# Patient Record
Sex: FEMALE | Race: ASIAN | NOT HISPANIC OR LATINO | Employment: UNEMPLOYED | URBAN - METROPOLITAN AREA
[De-identification: names, ages, dates, MRNs, and addresses within clinical notes are randomized per-mention and may not be internally consistent; named-entity substitution may affect disease eponyms.]

---

## 2018-06-28 ENCOUNTER — OFFICE VISIT (OUTPATIENT)
Dept: INTERNAL MEDICINE CLINIC | Facility: CLINIC | Age: 58
End: 2018-06-28
Payer: COMMERCIAL

## 2018-06-28 VITALS
HEIGHT: 59 IN | DIASTOLIC BLOOD PRESSURE: 70 MMHG | RESPIRATION RATE: 14 BRPM | SYSTOLIC BLOOD PRESSURE: 110 MMHG | WEIGHT: 153 LBS | BODY MASS INDEX: 30.84 KG/M2 | HEART RATE: 60 BPM | TEMPERATURE: 98.3 F

## 2018-06-28 DIAGNOSIS — Z12.39 SCREENING FOR MALIGNANT NEOPLASM OF BREAST: ICD-10-CM
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
## 2018-06-28 DIAGNOSIS — M25.561 ACUTE PAIN OF RIGHT KNEE: ICD-10-CM

## 2018-06-28 DIAGNOSIS — Z00.00 PHYSICAL EXAM, ANNUAL: Primary | ICD-10-CM

## 2018-06-28 DIAGNOSIS — E78.5 HYPERLIPIDEMIA, UNSPECIFIED HYPERLIPIDEMIA TYPE: ICD-10-CM

## 2018-06-28 PROCEDURE — 99386 PREV VISIT NEW AGE 40-64: CPT | Performed by: INTERNAL MEDICINE

## 2018-06-29 NOTE — PROGRESS NOTES
Assessment/Plan     Healthy female exam      1  Keep the area of blister clean, non adherant bandage  Ice application for the knee with ibuprofen as needed  2  Patient Counseling:  --Nutrition: Stressed importance of moderation in sodium/caffeine intake, saturated fat and cholesterol, caloric balance, sufficient intake of fresh fruits, vegetables, fiber, calcium, iron, and 1 mg of folate supplement per day (for females capable of pregnancy)  --Exercise: Stressed the importance of regular exercise  Slowly increase weight bearing as tolerated  --Substance Abuse: Discussed cessation/primary prevention of tobacco, alcohol, or other drug use; driving or other dangerous activities under the influence; availability of treatment for abuse  --Immunizations reviewed and updated  --Metabolic screening was reviewed and updated  High triglycerides, discussed about carb reduction, weight loss  --Cancer screening was reviewed and updated  Screening pap in 3/18, referred for mammo    3  Discussed the patient's BMI with her  The BMI is above average; BMI management plan is completed  4  Follow up 6 mo  Subjective     Osvaldo Doran is a 65 y o  female and is here for a comprehensive physical exam  The patient reports had a bug bite 10 days back, developed 2 blisters  1 popped in a day and healed, the other one popped 2 days back with minimal discharge now   Had acute pain in right knee 2 mo back, now slowly improving  No more swelling  Had bloodwork done in Dec  Family h/o positive for CAD  Had a stress test in March which was normal        The following portions of the patient's history were reviewed and updated as appropriate: current medications, past medical history, past social history and past surgical history  Review of Systems  Review of Systems   Constitutional: Negative for fatigue, fever and unexpected weight change  HENT: Negative for ear pain, hearing loss and sore throat      Eyes: Negative for pain and discharge  Respiratory: Negative for cough, chest tightness and shortness of breath  Cardiovascular: Negative for chest pain and palpitations  Gastrointestinal: Negative for abdominal pain, blood in stool, constipation, diarrhea and nausea  Genitourinary: Negative for dysuria, frequency and hematuria  Musculoskeletal: Positive for arthralgias  Negative for joint swelling  Skin: Positive for wound  Negative for rash  Allergic/Immunologic: Negative for immunocompromised state  Neurological: Negative for dizziness and headaches  Hematological: Negative for adenopathy  Psychiatric/Behavioral: Negative for confusion and sleep disturbance  /70 (BP Location: Left arm, Patient Position: Sitting, Cuff Size: Standard)   Pulse 60   Temp 98 3 °F (36 8 °C)   Resp 14   Ht 4' 11" (1 499 m)   Wt 69 4 kg (153 lb)   BMI 30 90 kg/m²      Physical Exam   Constitutional: She appears well-developed and well-nourished  HENT:   Head: Normocephalic and atraumatic  Right Ear: Tympanic membrane normal    Left Ear: Tympanic membrane normal    Nose: Nose normal    Mouth/Throat: Oropharynx is clear and moist  No posterior oropharyngeal edema or posterior oropharyngeal erythema  Eyes: Conjunctivae are normal  Pupils are equal, round, and reactive to light  Right eye exhibits no discharge  Neck: Normal range of motion  Neck supple  No thyromegaly present  Cardiovascular: Normal rate, regular rhythm, S1 normal, S2 normal and normal heart sounds  PMI is not displaced  No murmur heard  Pulmonary/Chest: Effort normal and breath sounds normal  No accessory muscle usage  No apnea  No respiratory distress  She has no rhonchi  She has no rales  Right breast exhibits no mass and no nipple discharge  Left breast exhibits no mass and no nipple discharge  Abdominal: Soft  Normal appearance and bowel sounds are normal  She exhibits no shifting dullness  There is no hepatosplenomegaly   There is no tenderness  There is no rebound and no CVA tenderness  Musculoskeletal: Normal range of motion  She exhibits no edema or tenderness  Right knee: She exhibits effusion  No tenderness found  Legs:  Lymphadenopathy:     She has no cervical adenopathy  She has no axillary adenopathy  Neurological: She is alert  Skin: Skin is warm and intact  No rash noted  Psychiatric: She has a normal mood and affect  Her speech is normal    Nursing note and vitals reviewed

## 2018-07-02 ENCOUNTER — HOSPITAL ENCOUNTER (OUTPATIENT)
Dept: RADIOLOGY | Facility: HOSPITAL | Age: 58
Discharge: HOME | End: 2018-07-02
Payer: COMMERCIAL

## 2018-07-02 ENCOUNTER — HOSPITAL ENCOUNTER (OUTPATIENT)
Dept: MAMMOGRAPHY | Facility: HOSPITAL | Age: 58
Discharge: HOME | End: 2018-07-02
Payer: COMMERCIAL

## 2018-07-02 DIAGNOSIS — M25.561 ACUTE PAIN OF RIGHT KNEE: ICD-10-CM

## 2018-07-02 DIAGNOSIS — Z12.39 SCREENING FOR MALIGNANT NEOPLASM OF BREAST: ICD-10-CM

## 2018-07-02 PROCEDURE — 77067 SCR MAMMO BI INCL CAD: CPT

## 2018-07-02 PROCEDURE — 73562 X-RAY EXAM OF KNEE 3: CPT

## 2018-07-05 ENCOUNTER — TELEPHONE (OUTPATIENT)
Dept: INTERNAL MEDICINE CLINIC | Facility: CLINIC | Age: 58
End: 2018-07-05

## 2018-07-05 NOTE — TELEPHONE ENCOUNTER
----- Message from Della Vogel MD sent at 7/4/2018 10:51 PM EDT -----  Tomeka Conception showed arthritis of the knee

## 2019-03-13 ENCOUNTER — APPOINTMENT (OUTPATIENT)
Dept: LAB | Facility: MEDICAL CENTER | Age: 59
End: 2019-03-13
Payer: COMMERCIAL

## 2019-03-13 DIAGNOSIS — M25.561 ACUTE PAIN OF RIGHT KNEE: ICD-10-CM

## 2019-03-13 DIAGNOSIS — E78.5 HYPERLIPIDEMIA, UNSPECIFIED HYPERLIPIDEMIA TYPE: ICD-10-CM

## 2019-03-13 LAB
ALBUMIN SERPL BCP-MCNC: 4.3 G/DL (ref 3.5–5)
ALP SERPL-CCNC: 58 U/L (ref 46–116)
ALT SERPL W P-5'-P-CCNC: 31 U/L (ref 12–78)
ANION GAP SERPL CALCULATED.3IONS-SCNC: 6 MMOL/L (ref 4–13)
AST SERPL W P-5'-P-CCNC: 16 U/L (ref 5–45)
BASOPHILS # BLD AUTO: 0.07 THOUSANDS/ΜL (ref 0–0.1)
BASOPHILS NFR BLD AUTO: 1 % (ref 0–1)
BILIRUB SERPL-MCNC: 0.25 MG/DL (ref 0.2–1)
BUN SERPL-MCNC: 12 MG/DL (ref 5–25)
CALCIUM SERPL-MCNC: 9.1 MG/DL (ref 8.3–10.1)
CHLORIDE SERPL-SCNC: 107 MMOL/L (ref 100–108)
CHOLEST SERPL-MCNC: 258 MG/DL (ref 50–200)
CO2 SERPL-SCNC: 28 MMOL/L (ref 21–32)
CREAT SERPL-MCNC: 0.68 MG/DL (ref 0.6–1.3)
EOSINOPHIL # BLD AUTO: 0.14 THOUSAND/ΜL (ref 0–0.61)
EOSINOPHIL NFR BLD AUTO: 3 % (ref 0–6)
ERYTHROCYTE [DISTWIDTH] IN BLOOD BY AUTOMATED COUNT: 14.4 % (ref 11.6–15.1)
EST. AVERAGE GLUCOSE BLD GHB EST-MCNC: 143 MG/DL
GFR SERPL CREATININE-BSD FRML MDRD: 97 ML/MIN/1.73SQ M
GLUCOSE P FAST SERPL-MCNC: 90 MG/DL (ref 65–99)
HBA1C MFR BLD: 6.6 % (ref 4.2–6.3)
HCT VFR BLD AUTO: 39.2 % (ref 34.8–46.1)
HDLC SERPL-MCNC: 80 MG/DL (ref 40–60)
HGB BLD-MCNC: 12.3 G/DL (ref 11.5–15.4)
IMM GRANULOCYTES # BLD AUTO: 0.01 THOUSAND/UL (ref 0–0.2)
IMM GRANULOCYTES NFR BLD AUTO: 0 % (ref 0–2)
LDLC SERPL CALC-MCNC: 148 MG/DL (ref 0–100)
LYMPHOCYTES # BLD AUTO: 2.25 THOUSANDS/ΜL (ref 0.6–4.47)
LYMPHOCYTES NFR BLD AUTO: 43 % (ref 14–44)
MCH RBC QN AUTO: 26.2 PG (ref 26.8–34.3)
MCHC RBC AUTO-ENTMCNC: 31.4 G/DL (ref 31.4–37.4)
MCV RBC AUTO: 84 FL (ref 82–98)
MONOCYTES # BLD AUTO: 0.45 THOUSAND/ΜL (ref 0.17–1.22)
MONOCYTES NFR BLD AUTO: 9 % (ref 4–12)
NEUTROPHILS # BLD AUTO: 2.36 THOUSANDS/ΜL (ref 1.85–7.62)
NEUTS SEG NFR BLD AUTO: 44 % (ref 43–75)
NRBC BLD AUTO-RTO: 0 /100 WBCS
PLATELET # BLD AUTO: 233 THOUSANDS/UL (ref 149–390)
PMV BLD AUTO: 10.1 FL (ref 8.9–12.7)
POTASSIUM SERPL-SCNC: 4.3 MMOL/L (ref 3.5–5.3)
PROT SERPL-MCNC: 7.3 G/DL (ref 6.4–8.2)
RBC # BLD AUTO: 4.69 MILLION/UL (ref 3.81–5.12)
SODIUM SERPL-SCNC: 141 MMOL/L (ref 136–145)
TRIGL SERPL-MCNC: 148 MG/DL
TSH SERPL DL<=0.05 MIU/L-ACNC: 3.22 UIU/ML (ref 0.36–3.74)
WBC # BLD AUTO: 5.28 THOUSAND/UL (ref 4.31–10.16)

## 2019-03-13 PROCEDURE — 80053 COMPREHEN METABOLIC PANEL: CPT

## 2019-03-13 PROCEDURE — 84443 ASSAY THYROID STIM HORMONE: CPT

## 2019-03-13 PROCEDURE — 83036 HEMOGLOBIN GLYCOSYLATED A1C: CPT

## 2019-03-13 PROCEDURE — 85025 COMPLETE CBC W/AUTO DIFF WBC: CPT

## 2019-03-13 PROCEDURE — 36415 COLL VENOUS BLD VENIPUNCTURE: CPT

## 2019-03-13 PROCEDURE — 80061 LIPID PANEL: CPT

## 2019-03-20 ENCOUNTER — OFFICE VISIT (OUTPATIENT)
Dept: INTERNAL MEDICINE CLINIC | Facility: CLINIC | Age: 59
End: 2019-03-20
Payer: COMMERCIAL

## 2019-03-20 VITALS
HEIGHT: 59 IN | RESPIRATION RATE: 16 BRPM | HEART RATE: 62 BPM | DIASTOLIC BLOOD PRESSURE: 60 MMHG | TEMPERATURE: 98.9 F | SYSTOLIC BLOOD PRESSURE: 100 MMHG | BODY MASS INDEX: 31.17 KG/M2 | WEIGHT: 154.6 LBS

## 2019-03-20 DIAGNOSIS — M17.11 PRIMARY OSTEOARTHRITIS OF RIGHT KNEE: ICD-10-CM

## 2019-03-20 DIAGNOSIS — R73.03 PREDIABETES: ICD-10-CM

## 2019-03-20 DIAGNOSIS — E78.49 OTHER HYPERLIPIDEMIA: Primary | ICD-10-CM

## 2019-03-20 DIAGNOSIS — E66.3 OVERWEIGHT (BMI 25.0-29.9): ICD-10-CM

## 2019-03-20 PROCEDURE — 3008F BODY MASS INDEX DOCD: CPT | Performed by: INTERNAL MEDICINE

## 2019-03-20 PROCEDURE — 1036F TOBACCO NON-USER: CPT | Performed by: INTERNAL MEDICINE

## 2019-03-20 PROCEDURE — 99214 OFFICE O/P EST MOD 30 MIN: CPT | Performed by: INTERNAL MEDICINE

## 2019-03-21 NOTE — ASSESSMENT & PLAN NOTE
a1c 6 6, fasting bloodsugar 90  Recheck in 3months, if still above 6 5 to diagnose diabetes and start metformin   Avoid simple carbd excessively

## 2019-03-21 NOTE — ASSESSMENT & PLAN NOTE
Continue Tylenol as needed, try swimming or biking has cardiovascular exercise which may not make knee pain worse

## 2019-03-21 NOTE — PROGRESS NOTES
Assessment/Plan:    Overweight (BMI 25 0-29  9)  Advised lifestyle modifications, try to increase intake of vegetable, protein, regular exercise    Other hyperlipidemia  Lipid panel shows worsening ldl as compared to prior bloodwork in 2017    Prediabetes  a1c 6 6, fasting bloodsugar 90  Recheck in 3months, if still above 6 5 to diagnose diabetes and start metformin  Avoid simple carbd excessively    Primary osteoarthritis of right knee  Continue Tylenol as needed, try swimming or biking has cardiovascular exercise which may not make knee pain worse  Declines colon cancer screening     Diagnoses and all orders for this visit:    Other hyperlipidemia  -     Basic metabolic panel  -     Hemoglobin A1C  -     Lipid Panel with Direct LDL reflex    Prediabetes  -     Basic metabolic panel  -     Hemoglobin A1C  -     Lipid Panel with Direct LDL reflex    Overweight (BMI 25 0-29  9)    Primary osteoarthritis of right knee          Subjective:   Chief Complaint   Patient presents with    Follow-up        Patient ID: Corrie Landon is a 63 y o  female  She comes in for discussion of bloodwork results  Was in [facility] few months back, had some pain in the knee on the right side  Recently joined a gym and will long to start regular exercise  No chest pain or shortness of breath  The following portions of the patient's history were reviewed and updated as appropriate: current medications, past medical history, past social history and past surgical history  PHQ-9 Depression Screening    PHQ-9:    Frequency of the following problems over the past two weeks:              No current outpatient medications on file  Review of Systems   Constitutional: Negative for fatigue, fever and unexpected weight change  HENT: Negative for ear pain, hearing loss and sore throat  Eyes: Negative for pain and discharge  Respiratory: Negative for cough, chest tightness and shortness of breath      Cardiovascular: Negative for chest pain and palpitations  Gastrointestinal: Negative for abdominal pain, blood in stool, constipation, diarrhea and nausea  Genitourinary: Negative for dysuria, frequency and hematuria  Musculoskeletal: Positive for arthralgias  Negative for joint swelling  Skin: Negative for rash  Allergic/Immunologic: Negative for immunocompromised state  Neurological: Negative for dizziness and headaches  Hematological: Negative for adenopathy  Psychiatric/Behavioral: Negative for confusion and sleep disturbance  Objective:  /60 (BP Location: Left arm, Patient Position: Sitting, Cuff Size: Standard)   Pulse 62   Temp 98 9 °F (37 2 °C)   Resp 16   Ht 4' 11" (1 499 m)   Wt 70 1 kg (154 lb 9 6 oz)   BMI 31 23 kg/m²      Physical Exam   Constitutional: She appears well-developed and well-nourished  HENT:   Head: Normocephalic and atraumatic  Right Ear: Tympanic membrane normal    Left Ear: Tympanic membrane normal    Nose: Nose normal    Mouth/Throat: Oropharynx is clear and moist  No posterior oropharyngeal edema or posterior oropharyngeal erythema  Eyes: Pupils are equal, round, and reactive to light  Conjunctivae are normal  Right eye exhibits no discharge  Neck: Normal range of motion  Neck supple  No thyromegaly present  Cardiovascular: Normal rate, regular rhythm, S1 normal, S2 normal and normal heart sounds  PMI is not displaced  No murmur heard  Pulmonary/Chest: Effort normal and breath sounds normal  No accessory muscle usage  No apnea  No respiratory distress  She has no rhonchi  She has no rales  Abdominal: Soft  Normal appearance and bowel sounds are normal  She exhibits no shifting dullness  There is no hepatosplenomegaly  There is no tenderness  There is no rebound and no CVA tenderness  Musculoskeletal: Normal range of motion  She exhibits no edema or tenderness  Lymphadenopathy:     She has no cervical adenopathy  Neurological: She is alert     Skin: Skin is warm and intact  No rash noted  Psychiatric: She has a normal mood and affect  Her speech is normal    Nursing note and vitals reviewed          Recent Results (from the past 1008 hour(s))   CBC and differential    Collection Time: 03/13/19  9:47 AM   Result Value Ref Range    WBC 5 28 4 31 - 10 16 Thousand/uL    RBC 4 69 3 81 - 5 12 Million/uL    Hemoglobin 12 3 11 5 - 15 4 g/dL    Hematocrit 39 2 34 8 - 46 1 %    MCV 84 82 - 98 fL    MCH 26 2 (L) 26 8 - 34 3 pg    MCHC 31 4 31 4 - 37 4 g/dL    RDW 14 4 11 6 - 15 1 %    MPV 10 1 8 9 - 12 7 fL    Platelets 565 473 - 062 Thousands/uL    nRBC 0 /100 WBCs    Neutrophils Relative 44 43 - 75 %    Immat GRANS % 0 0 - 2 %    Lymphocytes Relative 43 14 - 44 %    Monocytes Relative 9 4 - 12 %    Eosinophils Relative 3 0 - 6 %    Basophils Relative 1 0 - 1 %    Neutrophils Absolute 2 36 1 85 - 7 62 Thousands/µL    Immature Grans Absolute 0 01 0 00 - 0 20 Thousand/uL    Lymphocytes Absolute 2 25 0 60 - 4 47 Thousands/µL    Monocytes Absolute 0 45 0 17 - 1 22 Thousand/µL    Eosinophils Absolute 0 14 0 00 - 0 61 Thousand/µL    Basophils Absolute 0 07 0 00 - 0 10 Thousands/µL   Comprehensive metabolic panel    Collection Time: 03/13/19  9:47 AM   Result Value Ref Range    Sodium 141 136 - 145 mmol/L    Potassium 4 3 3 5 - 5 3 mmol/L    Chloride 107 100 - 108 mmol/L    CO2 28 21 - 32 mmol/L    ANION GAP 6 4 - 13 mmol/L    BUN 12 5 - 25 mg/dL    Creatinine 0 68 0 60 - 1 30 mg/dL    Glucose, Fasting 90 65 - 99 mg/dL    Calcium 9 1 8 3 - 10 1 mg/dL    AST 16 5 - 45 U/L    ALT 31 12 - 78 U/L    Alkaline Phosphatase 58 46 - 116 U/L    Total Protein 7 3 6 4 - 8 2 g/dL    Albumin 4 3 3 5 - 5 0 g/dL    Total Bilirubin 0 25 0 20 - 1 00 mg/dL    eGFR 97 ml/min/1 73sq m   Hemoglobin A1C    Collection Time: 03/13/19  9:47 AM   Result Value Ref Range    Hemoglobin A1C 6 6 (H) 4 2 - 6 3 %     mg/dl   Lipid Panel with Direct LDL reflex    Collection Time: 03/13/19  9:47 AM   Result Value Ref Range    Cholesterol 258 (H) 50 - 200 mg/dL    Triglycerides 148 <=150 mg/dL    HDL, Direct 80 (H) 40 - 60 mg/dL    LDL Calculated 148 (H) 0 - 100 mg/dL   TSH, 3rd generation with Free T4 reflex    Collection Time: 03/13/19  9:47 AM   Result Value Ref Range    TSH 3RD GENERATON 3 220 0 358 - 3 740 uIU/mL   ]    No results found  BMI Counseling: Body mass index is 31 23 kg/m²  Discussed the patient's BMI with her  The BMI is above average  BMI counseling and education was provided to the patient  Nutrition recommendations include 3-5 servings of fruits/vegetables daily and moderation in carbohydrate intake  Exercise recommendations include exercising 3-5 times per week

## 2019-07-15 DIAGNOSIS — Z12.39 BREAST CANCER SCREENING: Primary | ICD-10-CM

## 2019-08-06 ENCOUNTER — HOSPITAL ENCOUNTER (OUTPATIENT)
Dept: MAMMOGRAPHY | Facility: MEDICAL CENTER | Age: 59
Discharge: HOME | End: 2019-08-06
Payer: COMMERCIAL

## 2019-08-06 VITALS — BODY MASS INDEX: 29.43 KG/M2 | WEIGHT: 146 LBS | HEIGHT: 59 IN

## 2019-08-06 DIAGNOSIS — Z12.39 BREAST CANCER SCREENING: ICD-10-CM

## 2019-08-06 PROCEDURE — 77063 BREAST TOMOSYNTHESIS BI: CPT

## 2019-08-06 PROCEDURE — 77067 SCR MAMMO BI INCL CAD: CPT

## 2019-08-13 ENCOUNTER — OFFICE VISIT (OUTPATIENT)
Dept: INTERNAL MEDICINE CLINIC | Facility: CLINIC | Age: 59
End: 2019-08-13
Payer: COMMERCIAL

## 2019-08-13 VITALS
BODY MASS INDEX: 29.64 KG/M2 | DIASTOLIC BLOOD PRESSURE: 60 MMHG | SYSTOLIC BLOOD PRESSURE: 122 MMHG | HEART RATE: 75 BPM | WEIGHT: 147 LBS | TEMPERATURE: 98.9 F | RESPIRATION RATE: 16 BRPM | HEIGHT: 59 IN

## 2019-08-13 DIAGNOSIS — Z00.00 PHYSICAL EXAM, ANNUAL: Primary | ICD-10-CM

## 2019-08-13 DIAGNOSIS — Z12.11 SCREENING FOR COLON CANCER: ICD-10-CM

## 2019-08-13 DIAGNOSIS — Z11.59 NEED FOR HEPATITIS C SCREENING TEST: ICD-10-CM

## 2019-08-13 DIAGNOSIS — E78.49 OTHER HYPERLIPIDEMIA: ICD-10-CM

## 2019-08-13 DIAGNOSIS — R73.03 PREDIABETES: ICD-10-CM

## 2019-08-13 DIAGNOSIS — Z11.1 SCREENING FOR TUBERCULOSIS: ICD-10-CM

## 2019-08-13 PROCEDURE — 99396 PREV VISIT EST AGE 40-64: CPT | Performed by: INTERNAL MEDICINE

## 2019-08-13 PROCEDURE — 86580 TB INTRADERMAL TEST: CPT | Performed by: INTERNAL MEDICINE

## 2019-08-13 NOTE — PROGRESS NOTES
Assessment/Plan     Healthy female exam      1  PPD was placed today, to return back in 2 days for the reading  She lost about 7 lb from the last office visit and trying to eat better and keep herself physically active  2  Patient Counseling:  --Nutrition: Stressed importance of moderation in sodium/caffeine intake, saturated fat and cholesterol, caloric balance, sufficient intake of fresh fruits, vegetables, fiber, calcium, iron, and 1 mg of folate supplement per day (for females capable of pregnancy)  --Exercise: Stressed the importance of regular exercise  --Immunizations reviewed and updated  Influenza vaccination in the fall, advised to check about coverage for shingrix  We do not have records of varicella or MMR vaccination, if her employer needs approved for vaccination, titers can be done  --Metabolic screening was reviewed and updated  Check A1c, lipid panel, hopefully should be improved with significant change in lifestyle  --Cancer screening was reviewed and updated  Mammography showed a BI-RADS 0 on the right side, she does have a prior history of a breast lump  Diagnostic mammo and ultrasound will be arranged for by the office  She never had a colonoscopy no prior history or family history of colon cancer  Will get a fit test     3  Discussed the patient's BMI with her  The BMI is above average; BMI management plan is completed  4  Follow up Six months  Subjective     Shira Aranda is a 63 y o  female and is here for a comprehensive physical exam  The patient reports Feeling better  Her knees are not as bad  She has cut back on sugar intake, eating more fresh fruits and vegetables and going for a walk regularly  Tatyana Lightning She will be starting work with the [employer]  The following portions of the patient's history were reviewed and updated as appropriate: current medications, past medical history, past social history and past surgical history      Review of Systems  Review of Systems   Constitutional: Negative for fatigue, fever and unexpected weight change  HENT: Negative for ear pain, hearing loss and sore throat  Eyes: Negative for pain and discharge  Respiratory: Negative for cough, chest tightness and shortness of breath  Cardiovascular: Negative for chest pain and palpitations  Gastrointestinal: Negative for abdominal pain, blood in stool, constipation, diarrhea and nausea  Genitourinary: Negative for dysuria, frequency and hematuria  Musculoskeletal: Negative for arthralgias and joint swelling  Skin: Negative for rash  Allergic/Immunologic: Negative for immunocompromised state  Neurological: Negative for dizziness and headaches  Hematological: Negative for adenopathy  Psychiatric/Behavioral: Negative for confusion and sleep disturbance  /60 (BP Location: Left arm, Patient Position: Sitting, Cuff Size: Standard)   Pulse 75   Temp 98 9 °F (37 2 °C)   Resp 16   Ht 4' 11" (1 499 m)   Wt 66 7 kg (147 lb)   BMI 29 69 kg/m²      Physical Exam   Constitutional: She appears well-developed and well-nourished  HENT:   Head: Normocephalic and atraumatic  Right Ear: Tympanic membrane normal    Left Ear: Tympanic membrane normal    Nose: Nose normal    Mouth/Throat: Oropharynx is clear and moist  No posterior oropharyngeal edema or posterior oropharyngeal erythema  Eyes: Pupils are equal, round, and reactive to light  Conjunctivae are normal  Right eye exhibits no discharge  Left eye exhibits no discharge  Neck: Normal range of motion  Neck supple  No thyromegaly present  Cardiovascular: Normal rate, regular rhythm, S1 normal, S2 normal and normal heart sounds  PMI is not displaced  No murmur heard  Pulmonary/Chest: Effort normal and breath sounds normal  No accessory muscle usage  No apnea  No respiratory distress  She has no rhonchi  She has no rales  Abdominal: Soft   Normal appearance and bowel sounds are normal  She exhibits no shifting dullness  There is no hepatosplenomegaly  There is no tenderness  There is no rebound and no CVA tenderness  Musculoskeletal: Normal range of motion  She exhibits no edema or tenderness  Lymphadenopathy:     She has no cervical adenopathy  Neurological: She is alert  Skin: Skin is warm and intact  No rash noted  Psychiatric: She has a normal mood and affect  Her speech is normal    Nursing note and vitals reviewed

## 2019-08-14 ENCOUNTER — APPOINTMENT (OUTPATIENT)
Dept: LAB | Facility: MEDICAL CENTER | Age: 59
End: 2019-08-14
Payer: COMMERCIAL

## 2019-08-14 LAB
ALBUMIN SERPL BCP-MCNC: 4.9 G/DL (ref 3.5–5)
ALP SERPL-CCNC: 55 U/L (ref 46–116)
ALT SERPL W P-5'-P-CCNC: 26 U/L (ref 12–78)
ANION GAP SERPL CALCULATED.3IONS-SCNC: 8 MMOL/L (ref 4–13)
AST SERPL W P-5'-P-CCNC: 16 U/L (ref 5–45)
BASOPHILS # BLD AUTO: 0.04 THOUSANDS/ΜL (ref 0–0.1)
BASOPHILS NFR BLD AUTO: 1 % (ref 0–1)
BILIRUB SERPL-MCNC: 0.5 MG/DL (ref 0.2–1)
BUN SERPL-MCNC: 9 MG/DL (ref 5–25)
CALCIUM SERPL-MCNC: 9.7 MG/DL (ref 8.3–10.1)
CHLORIDE SERPL-SCNC: 108 MMOL/L (ref 100–108)
CHOLEST SERPL-MCNC: 226 MG/DL (ref 50–200)
CO2 SERPL-SCNC: 27 MMOL/L (ref 21–32)
CREAT SERPL-MCNC: 0.73 MG/DL (ref 0.6–1.3)
EOSINOPHIL # BLD AUTO: 0.09 THOUSAND/ΜL (ref 0–0.61)
EOSINOPHIL NFR BLD AUTO: 2 % (ref 0–6)
ERYTHROCYTE [DISTWIDTH] IN BLOOD BY AUTOMATED COUNT: 13.4 % (ref 11.6–15.1)
EST. AVERAGE GLUCOSE BLD GHB EST-MCNC: 123 MG/DL
GFR SERPL CREATININE-BSD FRML MDRD: 92 ML/MIN/1.73SQ M
GLUCOSE P FAST SERPL-MCNC: 82 MG/DL (ref 65–99)
HBA1C MFR BLD: 5.9 % (ref 4.2–6.3)
HCT VFR BLD AUTO: 40.4 % (ref 34.8–46.1)
HCV AB SER QL: NORMAL
HDLC SERPL-MCNC: 74 MG/DL (ref 40–60)
HEMOCCULT STL QL IA: NEGATIVE
HGB BLD-MCNC: 12.6 G/DL (ref 11.5–15.4)
IMM GRANULOCYTES # BLD AUTO: 0.01 THOUSAND/UL (ref 0–0.2)
IMM GRANULOCYTES NFR BLD AUTO: 0 % (ref 0–2)
LDLC SERPL CALC-MCNC: 130 MG/DL (ref 0–100)
LYMPHOCYTES # BLD AUTO: 2.03 THOUSANDS/ΜL (ref 0.6–4.47)
LYMPHOCYTES NFR BLD AUTO: 44 % (ref 14–44)
MCH RBC QN AUTO: 26.5 PG (ref 26.8–34.3)
MCHC RBC AUTO-ENTMCNC: 31.2 G/DL (ref 31.4–37.4)
MCV RBC AUTO: 85 FL (ref 82–98)
MONOCYTES # BLD AUTO: 0.38 THOUSAND/ΜL (ref 0.17–1.22)
MONOCYTES NFR BLD AUTO: 8 % (ref 4–12)
NEUTROPHILS # BLD AUTO: 2.06 THOUSANDS/ΜL (ref 1.85–7.62)
NEUTS SEG NFR BLD AUTO: 45 % (ref 43–75)
NRBC BLD AUTO-RTO: 0 /100 WBCS
PLATELET # BLD AUTO: 230 THOUSANDS/UL (ref 149–390)
PMV BLD AUTO: 10.2 FL (ref 8.9–12.7)
POTASSIUM SERPL-SCNC: 4.1 MMOL/L (ref 3.5–5.3)
PROT SERPL-MCNC: 7.4 G/DL (ref 6.4–8.2)
RBC # BLD AUTO: 4.76 MILLION/UL (ref 3.81–5.12)
SODIUM SERPL-SCNC: 143 MMOL/L (ref 136–145)
TRIGL SERPL-MCNC: 108 MG/DL
WBC # BLD AUTO: 4.61 THOUSAND/UL (ref 4.31–10.16)

## 2019-08-14 PROCEDURE — 80053 COMPREHEN METABOLIC PANEL: CPT | Performed by: INTERNAL MEDICINE

## 2019-08-14 PROCEDURE — 36415 COLL VENOUS BLD VENIPUNCTURE: CPT | Performed by: INTERNAL MEDICINE

## 2019-08-14 PROCEDURE — 3044F HG A1C LEVEL LT 7.0%: CPT | Performed by: INTERNAL MEDICINE

## 2019-08-14 PROCEDURE — G0328 FECAL BLOOD SCRN IMMUNOASSAY: HCPCS | Performed by: INTERNAL MEDICINE

## 2019-08-14 PROCEDURE — 83036 HEMOGLOBIN GLYCOSYLATED A1C: CPT | Performed by: INTERNAL MEDICINE

## 2019-08-14 PROCEDURE — 86803 HEPATITIS C AB TEST: CPT | Performed by: INTERNAL MEDICINE

## 2019-08-14 PROCEDURE — 85025 COMPLETE CBC W/AUTO DIFF WBC: CPT | Performed by: INTERNAL MEDICINE

## 2019-08-14 PROCEDURE — 80061 LIPID PANEL: CPT | Performed by: INTERNAL MEDICINE

## 2019-08-15 ENCOUNTER — HOSPITAL ENCOUNTER (OUTPATIENT)
Dept: MAMMOGRAPHY | Facility: CLINIC | Age: 59
Discharge: HOME | End: 2019-08-15
Payer: COMMERCIAL

## 2019-08-15 ENCOUNTER — HOSPITAL ENCOUNTER (OUTPATIENT)
Dept: ULTRASOUND IMAGING | Facility: CLINIC | Age: 59
Discharge: HOME | End: 2019-08-15
Payer: COMMERCIAL

## 2019-08-15 ENCOUNTER — TELEPHONE (OUTPATIENT)
Dept: INTERNAL MEDICINE CLINIC | Facility: CLINIC | Age: 59
End: 2019-08-15

## 2019-08-15 ENCOUNTER — HOSPITAL ENCOUNTER (OUTPATIENT)
Dept: RADIOLOGY | Facility: HOSPITAL | Age: 59
Discharge: HOME | End: 2019-08-15
Payer: COMMERCIAL

## 2019-08-15 VITALS — HEIGHT: 59 IN | WEIGHT: 147 LBS | BODY MASS INDEX: 29.64 KG/M2

## 2019-08-15 DIAGNOSIS — R92.8 ABNORMAL SCREENING MAMMOGRAM: ICD-10-CM

## 2019-08-15 DIAGNOSIS — Z11.1 SCREENING FOR TUBERCULOSIS: Primary | ICD-10-CM

## 2019-08-15 DIAGNOSIS — Z11.1 SCREENING FOR TUBERCULOSIS: ICD-10-CM

## 2019-08-15 PROCEDURE — 71046 X-RAY EXAM CHEST 2 VIEWS: CPT

## 2019-08-15 PROCEDURE — 77065 DX MAMMO INCL CAD UNI: CPT

## 2019-08-15 PROCEDURE — G0279 TOMOSYNTHESIS, MAMMO: HCPCS

## 2019-08-15 PROCEDURE — 76642 ULTRASOUND BREAST LIMITED: CPT

## 2019-08-15 NOTE — TELEPHONE ENCOUNTER
----- Message from Jeremias Alexandra MD sent at 8/15/2019  9:47 AM EDT -----  Blood work showed significant improvements in her diabetes test, cholesterol improved as well complete not completely normal but not in the critical level as it was before  Stool test was negative another things were all within normal limits  Discontinue the dietary changes and regular exercise  No need of medications at this time

## 2019-08-19 ENCOUNTER — TELEPHONE (OUTPATIENT)
Dept: INTERNAL MEDICINE CLINIC | Facility: CLINIC | Age: 59
End: 2019-08-19

## 2019-08-19 NOTE — TELEPHONE ENCOUNTER
Patient requested a copy of her Xray results to be faxed to Madison Gar at [facility]#[phone]

## 2020-02-03 ENCOUNTER — TELEPHONE (OUTPATIENT)
Dept: INTERNAL MEDICINE CLINIC | Facility: CLINIC | Age: 60
End: 2020-02-03

## 2020-02-03 NOTE — TELEPHONE ENCOUNTER
Patient has an appointment on 2/18 and wants to know if she needs lab work prior  Her Hgb A1C is not due until 2/14      Thank you

## 2020-02-04 DIAGNOSIS — E78.49 OTHER HYPERLIPIDEMIA: Primary | ICD-10-CM

## 2020-02-04 DIAGNOSIS — R73.03 PREDIABETES: ICD-10-CM

## 2020-02-14 ENCOUNTER — APPOINTMENT (OUTPATIENT)
Dept: LAB | Facility: MEDICAL CENTER | Age: 60
End: 2020-02-14
Payer: COMMERCIAL

## 2020-02-14 LAB
ALBUMIN SERPL BCP-MCNC: 4.1 G/DL (ref 3.5–5)
ALP SERPL-CCNC: 61 U/L (ref 46–116)
ALT SERPL W P-5'-P-CCNC: 25 U/L (ref 12–78)
ANION GAP SERPL CALCULATED.3IONS-SCNC: 6 MMOL/L (ref 4–13)
AST SERPL W P-5'-P-CCNC: 15 U/L (ref 5–45)
BILIRUB SERPL-MCNC: 0.47 MG/DL (ref 0.2–1)
BUN SERPL-MCNC: 10 MG/DL (ref 5–25)
CALCIUM SERPL-MCNC: 9.5 MG/DL (ref 8.3–10.1)
CHLORIDE SERPL-SCNC: 112 MMOL/L (ref 100–108)
CHOLEST SERPL-MCNC: 232 MG/DL (ref 50–200)
CO2 SERPL-SCNC: 25 MMOL/L (ref 21–32)
CREAT SERPL-MCNC: 0.7 MG/DL (ref 0.6–1.3)
EST. AVERAGE GLUCOSE BLD GHB EST-MCNC: 123 MG/DL
GFR SERPL CREATININE-BSD FRML MDRD: 96 ML/MIN/1.73SQ M
GLUCOSE P FAST SERPL-MCNC: 90 MG/DL (ref 65–99)
HBA1C MFR BLD: 5.9 %
HDLC SERPL-MCNC: 80 MG/DL
LDLC SERPL CALC-MCNC: 132 MG/DL (ref 0–100)
POTASSIUM SERPL-SCNC: 4.2 MMOL/L (ref 3.5–5.3)
PROT SERPL-MCNC: 7.2 G/DL (ref 6.4–8.2)
SODIUM SERPL-SCNC: 143 MMOL/L (ref 136–145)
TRIGL SERPL-MCNC: 101 MG/DL

## 2020-02-14 PROCEDURE — 36415 COLL VENOUS BLD VENIPUNCTURE: CPT | Performed by: INTERNAL MEDICINE

## 2020-02-14 PROCEDURE — 80061 LIPID PANEL: CPT | Performed by: INTERNAL MEDICINE

## 2020-02-14 PROCEDURE — 80053 COMPREHEN METABOLIC PANEL: CPT | Performed by: INTERNAL MEDICINE

## 2020-02-14 PROCEDURE — 83036 HEMOGLOBIN GLYCOSYLATED A1C: CPT | Performed by: INTERNAL MEDICINE

## 2020-02-18 ENCOUNTER — OFFICE VISIT (OUTPATIENT)
Dept: INTERNAL MEDICINE CLINIC | Facility: CLINIC | Age: 60
End: 2020-02-18
Payer: COMMERCIAL

## 2020-02-18 VITALS
HEIGHT: 59 IN | HEART RATE: 64 BPM | DIASTOLIC BLOOD PRESSURE: 66 MMHG | TEMPERATURE: 98.7 F | BODY MASS INDEX: 28.55 KG/M2 | WEIGHT: 141.6 LBS | RESPIRATION RATE: 14 BRPM | SYSTOLIC BLOOD PRESSURE: 101 MMHG

## 2020-02-18 DIAGNOSIS — Z12.11 SCREENING FOR COLON CANCER: ICD-10-CM

## 2020-02-18 DIAGNOSIS — Z11.4 SCREENING FOR HIV (HUMAN IMMUNODEFICIENCY VIRUS): ICD-10-CM

## 2020-02-18 DIAGNOSIS — R73.03 PREDIABETES: ICD-10-CM

## 2020-02-18 DIAGNOSIS — M17.11 PRIMARY OSTEOARTHRITIS OF RIGHT KNEE: Primary | ICD-10-CM

## 2020-02-18 DIAGNOSIS — E78.49 OTHER HYPERLIPIDEMIA: ICD-10-CM

## 2020-02-18 PROCEDURE — 1036F TOBACCO NON-USER: CPT | Performed by: INTERNAL MEDICINE

## 2020-02-18 PROCEDURE — 99214 OFFICE O/P EST MOD 30 MIN: CPT | Performed by: INTERNAL MEDICINE

## 2020-02-18 PROCEDURE — 3008F BODY MASS INDEX DOCD: CPT | Performed by: INTERNAL MEDICINE

## 2020-02-19 NOTE — PROGRESS NOTES
Assessment/Plan:  1  Primary osteoarthritis of right knee  -     diclofenac sodium (VOLTAREN) 1 %; Apply 2 g topically 4 (four) times a day  -     Comprehensive metabolic panel  -     Lipid Panel with Direct LDL reflex    2  Other hyperlipidemia  -     Comprehensive metabolic panel  -     Lipid Panel with Direct LDL reflex  -     Hemoglobin A1C    3  Prediabetes  -     Hemoglobin A1C    4  Screening for colon cancer  -     Occult Blood, Fecal Immunochemical    5  Screening for HIV (human immunodeficiency virus)  -     [facility] HIV 1/2 AG-AB combo; Future      We discussed her bloodwork results, great HDL with some elevation in LDl  We discussed ASCVD risk score and new cholesterol guideline  Doesn't come in category where benefit of statin would outweigh the risks  Would advise continued monitoring  Knee pain due to OA, try voltaren, tylenol, if needed will arrange for steroid injection  A1c is stable to get steroid injection if needed  Follow up in 6 mo with bloodwork and stool testing    Subjective:   Chief Complaint   Patient presents with    Follow-up     6 month        Patient ID: Krystina Pereyra is a 63 y o  female  She comes in for follow up of prediabetes, hyperlipidemia  Frustrated that cholesterol numbers didn't improve much  Knee has been hurting more when goes up and down steps      The following portions of the patient's history were reviewed and updated as appropriate: current medications, past medical history, past social history and past surgical history  PHQ-9 Depression Screening    PHQ-9:    Frequency of the following problems over the past two weeks:                Current Outpatient Medications:     diclofenac sodium (VOLTAREN) 1 %, Apply 2 g topically 4 (four) times a day, Disp: 1 Tube, Rfl: 2    Review of Systems   Constitutional: Negative for fatigue, fever and unexpected weight change  HENT: Negative for ear pain, hearing loss and sore throat      Eyes: Negative for pain and discharge  Respiratory: Negative for cough, chest tightness and shortness of breath  Cardiovascular: Negative for chest pain and palpitations  Gastrointestinal: Negative for abdominal pain, blood in stool, constipation, diarrhea and nausea  Genitourinary: Negative for dysuria, frequency and hematuria  Musculoskeletal: Positive for arthralgias  Negative for joint swelling  Skin: Negative for rash  Allergic/Immunologic: Negative for immunocompromised state  Neurological: Negative for dizziness and headaches  Hematological: Negative for adenopathy  Psychiatric/Behavioral: Negative for confusion and sleep disturbance  Objective:  /66 (BP Location: Left arm, Patient Position: Sitting, Cuff Size: Standard)   Pulse 64   Temp 98 7 °F (37 1 °C)   Resp 14   Ht 4' 11" (1 499 m)   Wt 64 2 kg (141 lb 9 6 oz)   BMI 28 60 kg/m²      Physical Exam   Constitutional: She appears well-developed and well-nourished  HENT:   Head: Normocephalic and atraumatic  Right Ear: Tympanic membrane normal    Left Ear: Tympanic membrane normal    Nose: Nose normal    Mouth/Throat: Oropharynx is clear and moist  No posterior oropharyngeal edema or posterior oropharyngeal erythema  Eyes: Pupils are equal, round, and reactive to light  Conjunctivae are normal  Right eye exhibits no discharge  Left eye exhibits no discharge  Neck: Normal range of motion  Neck supple  No thyromegaly present  Cardiovascular: Normal rate, regular rhythm, S1 normal, S2 normal and normal heart sounds  PMI is not displaced  No murmur heard  Pulmonary/Chest: Effort normal and breath sounds normal  No accessory muscle usage  No apnea  No respiratory distress  She has no rhonchi  She has no rales  Abdominal: Soft  Normal appearance and bowel sounds are normal  She exhibits no shifting dullness  There is no hepatosplenomegaly  There is no tenderness  There is no rebound and no CVA tenderness     Musculoskeletal: Normal range of motion  She exhibits no edema  Right knee: She exhibits effusion  Tenderness found  Medial joint line tenderness noted  Lymphadenopathy:     She has no cervical adenopathy  Neurological: She is alert  Skin: Skin is warm and intact  No rash noted  Psychiatric: She has a normal mood and affect  Her speech is normal    Nursing note and vitals reviewed  Recent Results (from the past 1008 hour(s))   Comprehensive metabolic panel    Collection Time: 02/14/20  7:54 AM   Result Value Ref Range    Sodium 143 136 - 145 mmol/L    Potassium 4 2 3 5 - 5 3 mmol/L    Chloride 112 (H) 100 - 108 mmol/L    CO2 25 21 - 32 mmol/L    ANION GAP 6 4 - 13 mmol/L    BUN 10 5 - 25 mg/dL    Creatinine 0 70 0 60 - 1 30 mg/dL    Glucose, Fasting 90 65 - 99 mg/dL    Calcium 9 5 8 3 - 10 1 mg/dL    AST 15 5 - 45 U/L    ALT 25 12 - 78 U/L    Alkaline Phosphatase 61 46 - 116 U/L    Total Protein 7 2 6 4 - 8 2 g/dL    Albumin 4 1 3 5 - 5 0 g/dL    Total Bilirubin 0 47 0 20 - 1 00 mg/dL    eGFR 96 ml/min/1 73sq m   Hemoglobin A1C    Collection Time: 02/14/20  7:54 AM   Result Value Ref Range    Hemoglobin A1C 5 9 (H) Normal 3 8-5 6%; PreDiabetic 5 7-6 4%; Diabetic >=6 5%; Glycemic control for adults with diabetes <7 0% %     mg/dl   Lipid Panel with Direct LDL reflex    Collection Time: 02/14/20  7:54 AM   Result Value Ref Range    Cholesterol 232 (H) 50 - 200 mg/dL    Triglycerides 101 <=150 mg/dL    HDL, Direct 80 >=40 mg/dL    LDL Calculated 132 (H) 0 - 100 mg/dL   ]    No results found

## 2020-07-25 DIAGNOSIS — M17.11 PRIMARY OSTEOARTHRITIS OF RIGHT KNEE: ICD-10-CM

## 2020-09-15 ENCOUNTER — APPOINTMENT (OUTPATIENT)
Dept: LAB | Facility: MEDICAL CENTER | Age: 60
End: 2020-09-15
Payer: COMMERCIAL

## 2020-09-15 DIAGNOSIS — Z11.4 SCREENING FOR HIV (HUMAN IMMUNODEFICIENCY VIRUS): ICD-10-CM

## 2020-09-15 LAB
ALBUMIN SERPL BCP-MCNC: 4.6 G/DL (ref 3.5–5)
ALP SERPL-CCNC: 61 U/L (ref 46–116)
ALT SERPL W P-5'-P-CCNC: 27 U/L (ref 12–78)
ANION GAP SERPL CALCULATED.3IONS-SCNC: 7 MMOL/L (ref 4–13)
AST SERPL W P-5'-P-CCNC: 15 U/L (ref 5–45)
BILIRUB SERPL-MCNC: 0.47 MG/DL (ref 0.2–1)
BUN SERPL-MCNC: 12 MG/DL (ref 5–25)
CALCIUM SERPL-MCNC: 9.9 MG/DL (ref 8.3–10.1)
CHLORIDE SERPL-SCNC: 107 MMOL/L (ref 100–108)
CHOLEST SERPL-MCNC: 269 MG/DL (ref 50–200)
CO2 SERPL-SCNC: 27 MMOL/L (ref 21–32)
CREAT SERPL-MCNC: 0.67 MG/DL (ref 0.6–1.3)
EST. AVERAGE GLUCOSE BLD GHB EST-MCNC: 126 MG/DL
GFR SERPL CREATININE-BSD FRML MDRD: 97 ML/MIN/1.73SQ M
GLUCOSE P FAST SERPL-MCNC: 89 MG/DL (ref 65–99)
HBA1C MFR BLD: 6 %
HDLC SERPL-MCNC: 76 MG/DL
LDLC SERPL CALC-MCNC: 165 MG/DL (ref 0–100)
POTASSIUM SERPL-SCNC: 4.2 MMOL/L (ref 3.5–5.3)
PROT SERPL-MCNC: 7.7 G/DL (ref 6.4–8.2)
SODIUM SERPL-SCNC: 141 MMOL/L (ref 136–145)
TRIGL SERPL-MCNC: 139 MG/DL

## 2020-09-15 PROCEDURE — 83036 HEMOGLOBIN GLYCOSYLATED A1C: CPT | Performed by: INTERNAL MEDICINE

## 2020-09-15 PROCEDURE — 80053 COMPREHEN METABOLIC PANEL: CPT | Performed by: INTERNAL MEDICINE

## 2020-09-15 PROCEDURE — 36415 COLL VENOUS BLD VENIPUNCTURE: CPT | Performed by: INTERNAL MEDICINE

## 2020-09-15 PROCEDURE — 87389 HIV-1 AG W/HIV-1&-2 AB AG IA: CPT

## 2020-09-15 PROCEDURE — 80061 LIPID PANEL: CPT | Performed by: INTERNAL MEDICINE

## 2020-09-16 LAB — HIV 1+2 AB+HIV1 P24 AG SERPL QL IA: NORMAL

## 2020-09-22 ENCOUNTER — OFFICE VISIT (OUTPATIENT)
Dept: INTERNAL MEDICINE CLINIC | Facility: CLINIC | Age: 60
End: 2020-09-22
Payer: COMMERCIAL

## 2020-09-22 VITALS
HEART RATE: 79 BPM | TEMPERATURE: 96.4 F | RESPIRATION RATE: 14 BRPM | SYSTOLIC BLOOD PRESSURE: 124 MMHG | DIASTOLIC BLOOD PRESSURE: 73 MMHG | HEIGHT: 59 IN | WEIGHT: 151.8 LBS | BODY MASS INDEX: 30.6 KG/M2

## 2020-09-22 DIAGNOSIS — E78.49 OTHER HYPERLIPIDEMIA: Primary | ICD-10-CM

## 2020-09-22 DIAGNOSIS — M17.11 PRIMARY OSTEOARTHRITIS OF RIGHT KNEE: ICD-10-CM

## 2020-09-22 DIAGNOSIS — R73.03 PREDIABETES: ICD-10-CM

## 2020-09-22 DIAGNOSIS — Z12.31 ENCOUNTER FOR SCREENING MAMMOGRAM FOR BREAST CANCER: ICD-10-CM

## 2020-09-22 DIAGNOSIS — Z23 ENCOUNTER FOR IMMUNIZATION: ICD-10-CM

## 2020-09-22 PROCEDURE — 1036F TOBACCO NON-USER: CPT | Performed by: INTERNAL MEDICINE

## 2020-09-22 PROCEDURE — 90471 IMMUNIZATION ADMIN: CPT | Performed by: INTERNAL MEDICINE

## 2020-09-22 PROCEDURE — 90682 RIV4 VACC RECOMBINANT DNA IM: CPT | Performed by: INTERNAL MEDICINE

## 2020-09-22 PROCEDURE — 3725F SCREEN DEPRESSION PERFORMED: CPT | Performed by: INTERNAL MEDICINE

## 2020-09-22 PROCEDURE — 99214 OFFICE O/P EST MOD 30 MIN: CPT | Performed by: INTERNAL MEDICINE

## 2020-09-22 NOTE — PROGRESS NOTES
Assessment/Plan:  1  Other hyperlipidemia  -     Comprehensive metabolic panel  -     Lipid Panel with Direct LDL reflex  -     TSH, 3rd generation with Free T4 reflex    2  Prediabetes  -     Hemoglobin A1C  -     TSH, 3rd generation with Free T4 reflex    3  Encounter for screening mammogram for breast cancer  -     Mammo screening bilateral w 3d & cad; Future; Expected date: 09/22/2020    4  Encounter for immunization  -     FLUBLOK: influenza vaccine, quadrivalent, recombinant, PF, 0 5 mL    5  Primary osteoarthritis of right knee     We discussed her blood work results  Advised to cut back on saturated fat, restart her regular exercise and have a combination of cardio workout and strength training  Recheck blood work in 6 months  ASCVD score is low, no other risk factors cell will avoid using a statin at this time  She has a very decent HDL as well    Knee pain seems to be doing okay  We discussed about some nonweightbearing exercises which may be more tolerable  Influenza vaccine was given today, advised to get fit testing which she has the kit at home  Due for her mammogram in the near future      Subjective:   Chief Complaint   Patient presents with    Follow-up     6 month        Patient ID: Johnathan Tang is a 63 y o  female  She comes in for follow up of prediabetes, hyperlipidemia  Admits that since she has been working from home her eating habits have gone in the wrong direction, not walking as much as she normally used to  The following portions of the patient's history were reviewed and updated as appropriate: current medications, past medical history, past social history and past surgical history      PHQ-9 Depression Screening    PHQ-9:    Frequency of the following problems over the past two weeks:       Little interest or pleasure in doing things:  0 - not at all  Feeling down, depressed, or hopeless:  0 - not at all  PHQ-2 Score:  0           Current Outpatient Medications:    diclofenac sodium (VOLTAREN) 1 %, Apply 2 g topically 4 (four) times a day, Disp: 100 g, Rfl: 2    Review of Systems   Constitutional: Negative for fatigue, fever and unexpected weight change  HENT: Negative for ear pain, hearing loss and sore throat  Eyes: Negative for pain and discharge  Respiratory: Negative for cough, chest tightness and shortness of breath  Cardiovascular: Negative for chest pain and palpitations  Gastrointestinal: Negative for abdominal pain, blood in stool, constipation, diarrhea and nausea  Genitourinary: Negative for dysuria, frequency and hematuria  Musculoskeletal: Positive for arthralgias  Negative for joint swelling  Skin: Negative for rash  Allergic/Immunologic: Negative for immunocompromised state  Neurological: Negative for dizziness and headaches  Hematological: Negative for adenopathy  Psychiatric/Behavioral: Negative for confusion and sleep disturbance  Objective:  /73 (BP Location: Left arm, Patient Position: Sitting, Cuff Size: Standard)   Pulse 79   Temp (!) 96 4 °F (35 8 °C)   Resp 14   Ht 4' 11" (1 499 m)   Wt 68 9 kg (151 lb 12 8 oz)   BMI 30 66 kg/m²      Physical Exam  Vitals signs and nursing note reviewed  Constitutional:       Appearance: Normal appearance  She is well-developed  HENT:      Head: Normocephalic and atraumatic  Right Ear: Tympanic membrane normal       Left Ear: Tympanic membrane normal       Nose: Nose normal    Eyes:      General:         Right eye: No discharge  Left eye: No discharge  Conjunctiva/sclera: Conjunctivae normal       Pupils: Pupils are equal, round, and reactive to light  Neck:      Musculoskeletal: Normal range of motion and neck supple  Thyroid: No thyromegaly  Cardiovascular:      Rate and Rhythm: Normal rate and regular rhythm  Chest Wall: PMI is not displaced  Heart sounds: Normal heart sounds, S1 normal and S2 normal  No murmur     Pulmonary: Effort: Pulmonary effort is normal  No accessory muscle usage or respiratory distress  Breath sounds: Normal breath sounds  No rhonchi or rales  Abdominal:      General: Bowel sounds are normal       Palpations: Abdomen is soft  There is no shifting dullness  Tenderness: There is no abdominal tenderness  There is no rebound  Musculoskeletal: Normal range of motion  General: No tenderness  Lymphadenopathy:      Cervical: No cervical adenopathy  Skin:     General: Skin is warm  Findings: No rash  Neurological:      Mental Status: She is alert  Psychiatric:         Speech: Speech normal            Recent Results (from the past 1008 hour(s))   Comprehensive metabolic panel    Collection Time: 09/15/20  8:28 AM   Result Value Ref Range    Sodium 141 136 - 145 mmol/L    Potassium 4 2 3 5 - 5 3 mmol/L    Chloride 107 100 - 108 mmol/L    CO2 27 21 - 32 mmol/L    ANION GAP 7 4 - 13 mmol/L    BUN 12 5 - 25 mg/dL    Creatinine 0 67 0 60 - 1 30 mg/dL    Glucose, Fasting 89 65 - 99 mg/dL    Calcium 9 9 8 3 - 10 1 mg/dL    AST 15 5 - 45 U/L    ALT 27 12 - 78 U/L    Alkaline Phosphatase 61 46 - 116 U/L    Total Protein 7 7 6 4 - 8 2 g/dL    Albumin 4 6 3 5 - 5 0 g/dL    Total Bilirubin 0 47 0 20 - 1 00 mg/dL    eGFR 97 ml/min/1 73sq m   Lipid Panel with Direct LDL reflex    Collection Time: 09/15/20  8:28 AM   Result Value Ref Range    Cholesterol 269 (H) 50 - 200 mg/dL    Triglycerides 139 <=150 mg/dL    HDL, Direct 76 >=40 mg/dL    LDL Calculated 165 (H) 0 - 100 mg/dL   Hemoglobin A1C    Collection Time: 09/15/20  8:28 AM   Result Value Ref Range    Hemoglobin A1C 6 0 (H) Normal 3 8-5 6%; PreDiabetic 5 7-6 4%; Diabetic >=6 5%; Glycemic control for adults with diabetes <7 0% %     mg/dl   [facility] HIV 1/2 AG-AB combo    Collection Time: 09/15/20  8:28 AM   Result Value Ref Range    HIV-1/HIV-2 Ab Non-Reactive Non-Reactive   ]    No results found

## 2020-12-21 ENCOUNTER — HOSPITAL ENCOUNTER (OUTPATIENT)
Dept: MAMMOGRAPHY | Facility: MEDICAL CENTER | Age: 60
Discharge: HOME | End: 2020-12-21
Payer: COMMERCIAL

## 2020-12-21 VITALS — BODY MASS INDEX: 30.44 KG/M2 | WEIGHT: 151 LBS | HEIGHT: 59 IN

## 2020-12-21 DIAGNOSIS — Z12.31 ENCOUNTER FOR SCREENING MAMMOGRAM FOR BREAST CANCER: ICD-10-CM

## 2020-12-21 PROCEDURE — 77067 SCR MAMMO BI INCL CAD: CPT

## 2020-12-21 PROCEDURE — 77063 BREAST TOMOSYNTHESIS BI: CPT

## 2021-03-26 DIAGNOSIS — Z23 ENCOUNTER FOR IMMUNIZATION: ICD-10-CM

## 2021-04-14 ENCOUNTER — RA CDI HCC (OUTPATIENT)
Dept: OTHER | Facility: HOSPITAL | Age: 61
End: 2021-04-14

## 2021-04-14 ENCOUNTER — APPOINTMENT (OUTPATIENT)
Dept: LAB | Facility: MEDICAL CENTER | Age: 61
End: 2021-04-14
Payer: COMMERCIAL

## 2021-04-14 LAB
ALBUMIN SERPL BCP-MCNC: 4.4 G/DL (ref 3.5–5)
ALP SERPL-CCNC: 64 U/L (ref 46–116)
ALT SERPL W P-5'-P-CCNC: 30 U/L (ref 12–78)
ANION GAP SERPL CALCULATED.3IONS-SCNC: 2 MMOL/L (ref 4–13)
AST SERPL W P-5'-P-CCNC: 18 U/L (ref 5–45)
BILIRUB SERPL-MCNC: 0.4 MG/DL (ref 0.2–1)
BUN SERPL-MCNC: 14 MG/DL (ref 5–25)
CALCIUM SERPL-MCNC: 9.5 MG/DL (ref 8.3–10.1)
CHLORIDE SERPL-SCNC: 109 MMOL/L (ref 100–108)
CHOLEST SERPL-MCNC: 241 MG/DL (ref 50–200)
CO2 SERPL-SCNC: 27 MMOL/L (ref 21–32)
CREAT SERPL-MCNC: 0.74 MG/DL (ref 0.6–1.3)
EST. AVERAGE GLUCOSE BLD GHB EST-MCNC: 123 MG/DL
GFR SERPL CREATININE-BSD FRML MDRD: 89 ML/MIN/1.73SQ M
GLUCOSE P FAST SERPL-MCNC: 87 MG/DL (ref 65–99)
HBA1C MFR BLD: 5.9 %
HDLC SERPL-MCNC: 81 MG/DL
LDLC SERPL CALC-MCNC: 134 MG/DL (ref 0–100)
POTASSIUM SERPL-SCNC: 4.2 MMOL/L (ref 3.5–5.3)
PROT SERPL-MCNC: 7.7 G/DL (ref 6.4–8.2)
SODIUM SERPL-SCNC: 138 MMOL/L (ref 136–145)
T4 FREE SERPL-MCNC: 1 NG/DL (ref 0.76–1.46)
TRIGL SERPL-MCNC: 131 MG/DL
TSH SERPL DL<=0.05 MIU/L-ACNC: 3.86 UIU/ML (ref 0.36–3.74)

## 2021-04-14 PROCEDURE — 36415 COLL VENOUS BLD VENIPUNCTURE: CPT | Performed by: INTERNAL MEDICINE

## 2021-04-14 PROCEDURE — 84439 ASSAY OF FREE THYROXINE: CPT | Performed by: INTERNAL MEDICINE

## 2021-04-14 PROCEDURE — 80061 LIPID PANEL: CPT | Performed by: INTERNAL MEDICINE

## 2021-04-14 PROCEDURE — 84443 ASSAY THYROID STIM HORMONE: CPT | Performed by: INTERNAL MEDICINE

## 2021-04-14 PROCEDURE — 83036 HEMOGLOBIN GLYCOSYLATED A1C: CPT | Performed by: INTERNAL MEDICINE

## 2021-04-14 PROCEDURE — 80053 COMPREHEN METABOLIC PANEL: CPT | Performed by: INTERNAL MEDICINE

## 2021-04-20 NOTE — PROGRESS NOTES
Assessment/Plan:  1  Other hyperlipidemia    2  Prediabetes    3  Overweight (BMI 25 0-29 9)    4  Primary osteoarthritis of right knee  -     Diclofenac Sodium (VOLTAREN) 1 %; Apply 2 g topically 4 (four) times a day    5  Subclinical hypothyroidism    6  Screening for colorectal cancer  -     Occult Blood, Fecal Immunochemical (FIT); Future      A1c is stable  LDL improved with a very good HDL  Has managed to lose 5 lb since the last appointment  Discussed about subclinical hypothyroidism in need to recheck  T4 is normal and she does not any other signs of hypothyroidism  Up-to-date on mammography  Completed COVID vaccination  She is due for fit testing which will be ordered today  Osteoarthritis has been stable with the use of topical NSAIDs which I encouraged her to continue  I asked her to call me if symptoms get worse and we can arrange for her to see orthopedics for injections in the knee  Subjective:   Chief Complaint   Patient presents with    Follow-up     6 month  follow up, needs BMI f/u   [address]     had cervical screen in 2018 message sent to [facility] team        Patient ID: Yamileth Ascencio is a 62 y o  female  She comes in for follow up of prediabetes, hyperlipidemia, OA knee        The following portions of the patient's history were reviewed and updated as appropriate: current medications, past medical history, past social history and past surgical history  PHQ-9 Depression Screening    PHQ-9:   Frequency of the following problems over the past two weeks:               Current Outpatient Medications:     Diclofenac Sodium (VOLTAREN) 1 %, Apply 2 g topically 4 (four) times a day, Disp: 1 Tube, Rfl: 2    Review of Systems   Constitutional: Negative for fatigue, fever and unexpected weight change  HENT: Negative for ear pain, hearing loss and sore throat  Eyes: Negative for pain and discharge     Respiratory: Negative for cough, chest tightness and shortness of breath  Cardiovascular: Negative for chest pain and palpitations  Gastrointestinal: Negative for abdominal pain, blood in stool, constipation, diarrhea and nausea  Genitourinary: Negative for dysuria, frequency and hematuria  Musculoskeletal: Negative for arthralgias and joint swelling  Skin: Negative for rash  Allergic/Immunologic: Negative for immunocompromised state  Neurological: Negative for dizziness and headaches  Hematological: Negative for adenopathy  Psychiatric/Behavioral: Negative for confusion and sleep disturbance  Objective:  /82 (BP Location: Left arm, Patient Position: Sitting, Cuff Size: Standard)   Pulse 60   Temp (!) 97 3 °F (36 3 °C) (Skin)   Ht 4' 11" (1 499 m)   Wt 65 8 kg (145 lb)   SpO2 98%   BMI 29 29 kg/m²      Physical Exam  Vitals signs and nursing note reviewed  Constitutional:       Appearance: Normal appearance  She is well-developed  HENT:      Head: Normocephalic and atraumatic  Right Ear: Tympanic membrane normal       Left Ear: Tympanic membrane normal       Nose: Nose normal    Eyes:      General:         Right eye: No discharge  Left eye: No discharge  Conjunctiva/sclera: Conjunctivae normal       Pupils: Pupils are equal, round, and reactive to light  Neck:      Musculoskeletal: Normal range of motion and neck supple  Thyroid: No thyromegaly  Cardiovascular:      Rate and Rhythm: Normal rate and regular rhythm  Chest Wall: PMI is not displaced  Heart sounds: Normal heart sounds, S1 normal and S2 normal  No murmur  Pulmonary:      Effort: Pulmonary effort is normal  No accessory muscle usage or respiratory distress  Breath sounds: Normal breath sounds  No rhonchi or rales  Abdominal:      General: Bowel sounds are normal       Palpations: Abdomen is soft  There is no shifting dullness  Tenderness: There is no abdominal tenderness  There is no rebound     Musculoskeletal: Normal range of motion  General: No tenderness  Lymphadenopathy:      Cervical: No cervical adenopathy  Skin:     General: Skin is warm  Findings: No rash  Neurological:      Mental Status: She is alert  Psychiatric:         Speech: Speech normal            Recent Results (from the past 1008 hour(s))   Comprehensive metabolic panel    Collection Time: 04/14/21  8:03 AM   Result Value Ref Range    Sodium 138 136 - 145 mmol/L    Potassium 4 2 3 5 - 5 3 mmol/L    Chloride 109 (H) 100 - 108 mmol/L    CO2 27 21 - 32 mmol/L    ANION GAP 2 (L) 4 - 13 mmol/L    BUN 14 5 - 25 mg/dL    Creatinine 0 74 0 60 - 1 30 mg/dL    Glucose, Fasting 87 65 - 99 mg/dL    Calcium 9 5 8 3 - 10 1 mg/dL    AST 18 5 - 45 U/L    ALT 30 12 - 78 U/L    Alkaline Phosphatase 64 46 - 116 U/L    Total Protein 7 7 6 4 - 8 2 g/dL    Albumin 4 4 3 5 - 5 0 g/dL    Total Bilirubin 0 40 0 20 - 1 00 mg/dL    eGFR 89 ml/min/1 73sq m   Hemoglobin A1C    Collection Time: 04/14/21  8:03 AM   Result Value Ref Range    Hemoglobin A1C 5 9 (H) Normal 3 8-5 6%; PreDiabetic 5 7-6 4%; Diabetic >=6 5%; Glycemic control for adults with diabetes <7 0% %     mg/dl   Lipid Panel with Direct LDL reflex    Collection Time: 04/14/21  8:03 AM   Result Value Ref Range    Cholesterol 241 (H) 50 - 200 mg/dL    Triglycerides 131 <=150 mg/dL    HDL, Direct 81 >=40 mg/dL    LDL Calculated 134 (H) 0 - 100 mg/dL   TSH, 3rd generation with Free T4 reflex    Collection Time: 04/14/21  8:03 AM   Result Value Ref Range    TSH 3RD GENERATON 3 860 (H) 0 358 - 3 740 uIU/mL   T4, free    Collection Time: 04/14/21  8:03 AM   Result Value Ref Range    Free T4 1 00 0 76 - 1 46 ng/dL   ]    No results found

## 2021-04-21 ENCOUNTER — OFFICE VISIT (OUTPATIENT)
Dept: INTERNAL MEDICINE CLINIC | Facility: CLINIC | Age: 61
End: 2021-04-21
Payer: COMMERCIAL

## 2021-04-21 VITALS
OXYGEN SATURATION: 98 % | SYSTOLIC BLOOD PRESSURE: 123 MMHG | HEART RATE: 60 BPM | TEMPERATURE: 97.3 F | DIASTOLIC BLOOD PRESSURE: 82 MMHG | HEIGHT: 59 IN | BODY MASS INDEX: 29.23 KG/M2 | WEIGHT: 145 LBS

## 2021-04-21 DIAGNOSIS — R73.03 PREDIABETES: ICD-10-CM

## 2021-04-21 DIAGNOSIS — E78.49 OTHER HYPERLIPIDEMIA: Primary | ICD-10-CM

## 2021-04-21 DIAGNOSIS — E66.3 OVERWEIGHT (BMI 25.0-29.9): ICD-10-CM

## 2021-04-21 DIAGNOSIS — Z12.12 SCREENING FOR COLORECTAL CANCER: ICD-10-CM

## 2021-04-21 DIAGNOSIS — E03.8 SUBCLINICAL HYPOTHYROIDISM: ICD-10-CM

## 2021-04-21 DIAGNOSIS — Z12.11 SCREENING FOR COLORECTAL CANCER: ICD-10-CM

## 2021-04-21 DIAGNOSIS — M17.11 PRIMARY OSTEOARTHRITIS OF RIGHT KNEE: ICD-10-CM

## 2021-04-21 PROCEDURE — 99214 OFFICE O/P EST MOD 30 MIN: CPT | Performed by: INTERNAL MEDICINE

## 2021-04-21 PROCEDURE — 1036F TOBACCO NON-USER: CPT | Performed by: INTERNAL MEDICINE

## 2021-04-21 PROCEDURE — 3008F BODY MASS INDEX DOCD: CPT | Performed by: INTERNAL MEDICINE

## 2021-09-23 ENCOUNTER — OFFICE VISIT (OUTPATIENT)
Dept: INTERNAL MEDICINE CLINIC | Facility: CLINIC | Age: 61
End: 2021-09-23
Payer: COMMERCIAL

## 2021-09-23 VITALS
WEIGHT: 150 LBS | SYSTOLIC BLOOD PRESSURE: 120 MMHG | OXYGEN SATURATION: 99 % | HEIGHT: 59 IN | HEART RATE: 71 BPM | BODY MASS INDEX: 30.24 KG/M2 | RESPIRATION RATE: 16 BRPM | TEMPERATURE: 96.9 F | DIASTOLIC BLOOD PRESSURE: 72 MMHG

## 2021-09-23 DIAGNOSIS — A92.0 CHIKUNGUNYA: ICD-10-CM

## 2021-09-23 DIAGNOSIS — M25.50 POLYARTHRALGIA: Primary | ICD-10-CM

## 2021-09-23 PROCEDURE — 99214 OFFICE O/P EST MOD 30 MIN: CPT | Performed by: INTERNAL MEDICINE

## 2021-09-23 PROCEDURE — 3008F BODY MASS INDEX DOCD: CPT | Performed by: INTERNAL MEDICINE

## 2021-09-23 PROCEDURE — 1036F TOBACCO NON-USER: CPT | Performed by: INTERNAL MEDICINE

## 2021-09-23 NOTE — PROGRESS NOTES
INTERNAL MEDICINE OFFICE VISIT  [facility] Internal Medicine- [place]    NAME: Malick Alcazar  AGE: 62 y o  SEX: female    DATE OF ENCOUNTER: 9/23/2021    Assessment and Plan     1  Polyarthralgia  - she is having significant polyarthralgias in the setting of recent diagnosis of Chikungunya in [facility]  - would continue supportive care for now with NSAIDs as needed, may also try Tylenol, topical therapies, ice/heat  - I offered steroid today, but she had a family member with Chikungunya who had complications from steroid treatment and she is not interested in this at this time  - would like to see her back in the office in 1 month  Will recheck inflammatory markers  If she has had no improvement, may touch base with rheumatology and/or Infectious Disease for further recommendations  - Basic metabolic panel; Future  - Sedimentation rate, automated; Future  - CBC and differential; Future  - C-reactive protein; Future    2  Chikungunya      BMI Counseling: Body mass index is 30 3 kg/m²  The BMI is above normal  Nutrition recommendations include decreasing portion sizes, encouraging healthy choices of fruits and vegetables, moderation in carbohydrate intake and increasing intake of lean protein  Exercise recommendations include moderate physical activity 150 minutes/week  No pharmacotherapy was ordered  Patient referred to PCP  Rationale for BMI follow-up plan is due to patient being overweight or obese  No orders of the defined types were placed in this encounter  Chief Complaint     Chief Complaint   Patient presents with    Join pain       History of Present Illness     Capo Mccauley is here today for evaluation of polyarthralgias     she states that she was diagnosed with Chikungunya this past August in [facility] which was confirmed on blood testing  About a week later she started to develop joint pains  At this point, her bilateral feet, hands, and shoulders R >L are affected   She has had swelling in her feet and states that she has had to drag her feet because of this  Her shoulders have felt very stiff and she has had issues with overhead activities  Her symptoms tend to be worse towards the end of the day  She has been using Motrin, about 400 milligrams in the morning, then 200 or another 400 milligrams later in the day which has helped with her symptoms  Has also been taking hot baths     she had family members were also diagnosed with Chikungunya and joint pains  They responded poorly to steroids and gabapentin  States her sister took steroids and her symptoms got worse and she had to be hospitalized    The following portions of the patient's history were reviewed and updated as appropriate: allergies, current medications, past family history, past medical history, past social history, past surgical history and problem list     Review of Systems     10 point ROS negative except per HPI    Active Problem List     Patient Active Problem List   Diagnosis    Overweight (BMI 25 0-29  9)    Other hyperlipidemia    Prediabetes    Primary osteoarthritis of right knee       Objective     /72 (BP Location: Left arm, Patient Position: Sitting, Cuff Size: Standard)   Pulse 71   Temp (!) 96 9 °F (36 1 °C)   Resp 16   Ht 4' 11" (1 499 m)   Wt 68 kg (150 lb)   SpO2 99%   BMI 30 30 kg/m²     Physical Exam  Constitutional:       Appearance: Normal appearance  She is not ill-appearing  HENT:      Head: Normocephalic and atraumatic  Eyes:      General: No scleral icterus  Right eye: No discharge  Left eye: No discharge  Cardiovascular:      Rate and Rhythm: Normal rate and regular rhythm  Heart sounds: No murmur heard  No friction rub  Pulmonary:      Effort: Pulmonary effort is normal       Breath sounds: Normal breath sounds  No wheezing or rales  Abdominal:      General: Abdomen is flat  There is no distension  Palpations: Abdomen is soft  Tenderness:  There is no abdominal tenderness  Musculoskeletal:         General: Swelling (Joint swelling noted right 3rd PIP) and tenderness present  Comments: Reduced active range of motion with right shoulder abduction   Skin:     General: Skin is warm and dry  Findings: No erythema  Neurological:      Mental Status: She is alert  Mental status is at baseline  Motor: No weakness  Psychiatric:         Mood and Affect: Mood normal          Behavior: Behavior normal          Pertinent Laboratory/Diagnostic Studies:  Mammo screening bilateral w 3d & cad    Result Date: 12/22/2020  Impression: No mammographic evidence of malignancy  ASSESSMENT/BI-RADS CATEGORY: Left: 1 - Negative Right: 1 - Negative Overall: 1 - Negative RECOMMENDATION:      - Routine screening mammogram in 1 year for both breasts   Workstation ID: [id number]       Images and diagnostics reviewed     Current Medications     Current Outpatient Medications:     Diclofenac Sodium (VOLTAREN) 1 %, Apply 2 g topically 4 (four) times a day, Disp: 1 Tube, Rfl: 2    Health Maintenance     Health Maintenance   Topic Date Due    Cervical Cancer Screening  Never done    BMI: Followup Plan  08/13/2020    Annual Physical  08/13/2020    Colorectal Cancer Screening  08/14/2020    Depression Screening  09/22/2021    Influenza Vaccine (1) 09/01/2021    Breast Cancer Screening: Mammogram  12/21/2021    BMI: Adult  09/23/2022    DTaP,Tdap,and Td Vaccines (2 - Td or Tdap) 10/06/2024    HIV Screening  Completed    Hepatitis C Screening  Completed    COVID-19 Vaccine  Completed    Pneumococcal Vaccine: Pediatrics (0 to 5 Years) and At-Risk Patients (6 to 59 Years)  Aged Out    HIB Vaccine  Aged Out    Hepatitis B Vaccine  Aged Out    IPV Vaccine  Aged Out    Hepatitis A Vaccine  Aged Out    Meningococcal ACWY Vaccine  Aged Out    HPV Vaccine  Aged [employer] History   Administered Date(s) Administered    Hep B, adult 11/10/2014, 12/22/2014, 06/04/2015    Influenza, recombinant, quadrivalent,injectable, preservative free 09/22/2020    Sars-cov-2 / Covid-19 vector-nr, [id number] vaccine Angelo Padilla / Mellody Buerger & Mellody Buerger) 03/17/2021, 03/17/2021    Tdap 10/06/2014    Tuberculin Skin Test-PPD Intradermal 08/13/2019       ROSE Powell  Internal Medicine [place] [address] [address], [facility] [id number]  [place], [address] [facility]  Office: [phone]

## 2021-10-23 ENCOUNTER — APPOINTMENT (OUTPATIENT)
Dept: LAB | Facility: MEDICAL CENTER | Age: 61
End: 2021-10-23
Payer: COMMERCIAL

## 2021-10-23 DIAGNOSIS — M25.50 POLYARTHRALGIA: ICD-10-CM

## 2021-10-23 LAB
ANION GAP SERPL CALCULATED.3IONS-SCNC: 2 MMOL/L (ref 4–13)
BASOPHILS # BLD AUTO: 0.06 THOUSANDS/ΜL (ref 0–0.1)
BASOPHILS NFR BLD AUTO: 1 % (ref 0–1)
BUN SERPL-MCNC: 9 MG/DL (ref 5–25)
CALCIUM SERPL-MCNC: 9.8 MG/DL (ref 8.3–10.1)
CHLORIDE SERPL-SCNC: 110 MMOL/L (ref 100–108)
CO2 SERPL-SCNC: 27 MMOL/L (ref 21–32)
CREAT SERPL-MCNC: 0.61 MG/DL (ref 0.6–1.3)
CRP SERPL QL: 4 MG/L
EOSINOPHIL # BLD AUTO: 0.1 THOUSAND/ΜL (ref 0–0.61)
EOSINOPHIL NFR BLD AUTO: 2 % (ref 0–6)
ERYTHROCYTE [DISTWIDTH] IN BLOOD BY AUTOMATED COUNT: 14.1 % (ref 11.6–15.1)
ERYTHROCYTE [SEDIMENTATION RATE] IN BLOOD: 42 MM/HOUR (ref 0–29)
GFR SERPL CREATININE-BSD FRML MDRD: 100 ML/MIN/1.73SQ M
GLUCOSE P FAST SERPL-MCNC: 88 MG/DL (ref 65–99)
HCT VFR BLD AUTO: 39.2 % (ref 34.8–46.1)
HGB BLD-MCNC: 12 G/DL (ref 11.5–15.4)
IMM GRANULOCYTES # BLD AUTO: 0.01 THOUSAND/UL (ref 0–0.2)
IMM GRANULOCYTES NFR BLD AUTO: 0 % (ref 0–2)
LYMPHOCYTES # BLD AUTO: 1.87 THOUSANDS/ΜL (ref 0.6–4.47)
LYMPHOCYTES NFR BLD AUTO: 38 % (ref 14–44)
MCH RBC QN AUTO: 26.1 PG (ref 26.8–34.3)
MCHC RBC AUTO-ENTMCNC: 30.6 G/DL (ref 31.4–37.4)
MCV RBC AUTO: 85 FL (ref 82–98)
MONOCYTES # BLD AUTO: 0.38 THOUSAND/ΜL (ref 0.17–1.22)
MONOCYTES NFR BLD AUTO: 8 % (ref 4–12)
NEUTROPHILS # BLD AUTO: 2.5 THOUSANDS/ΜL (ref 1.85–7.62)
NEUTS SEG NFR BLD AUTO: 51 % (ref 43–75)
NRBC BLD AUTO-RTO: 0 /100 WBCS
PLATELET # BLD AUTO: 284 THOUSANDS/UL (ref 149–390)
PMV BLD AUTO: 9.6 FL (ref 8.9–12.7)
POTASSIUM SERPL-SCNC: 4.6 MMOL/L (ref 3.5–5.3)
RBC # BLD AUTO: 4.6 MILLION/UL (ref 3.81–5.12)
SODIUM SERPL-SCNC: 139 MMOL/L (ref 136–145)
WBC # BLD AUTO: 4.92 THOUSAND/UL (ref 4.31–10.16)

## 2021-10-23 PROCEDURE — 85652 RBC SED RATE AUTOMATED: CPT

## 2021-10-23 PROCEDURE — 85025 COMPLETE CBC W/AUTO DIFF WBC: CPT

## 2021-10-23 PROCEDURE — 36415 COLL VENOUS BLD VENIPUNCTURE: CPT

## 2021-10-23 PROCEDURE — 80048 BASIC METABOLIC PNL TOTAL CA: CPT

## 2021-10-23 PROCEDURE — 86140 C-REACTIVE PROTEIN: CPT

## 2021-10-28 ENCOUNTER — TELEPHONE (OUTPATIENT)
Dept: INTERNAL MEDICINE CLINIC | Facility: CLINIC | Age: 61
End: 2021-10-28

## 2021-12-09 ENCOUNTER — TELEPHONE (OUTPATIENT)
Dept: INTERNAL MEDICINE CLINIC | Facility: CLINIC | Age: 61
End: 2021-12-09

## 2021-12-09 ENCOUNTER — TELEMEDICINE (OUTPATIENT)
Dept: INTERNAL MEDICINE CLINIC | Facility: CLINIC | Age: 61
End: 2021-12-09
Payer: COMMERCIAL

## 2021-12-09 VITALS — HEART RATE: 67 BPM | TEMPERATURE: 98 F | OXYGEN SATURATION: 98 %

## 2021-12-09 DIAGNOSIS — U07.1 COVID-19: Primary | ICD-10-CM

## 2021-12-09 PROCEDURE — 3725F SCREEN DEPRESSION PERFORMED: CPT | Performed by: INTERNAL MEDICINE

## 2021-12-09 PROCEDURE — 99214 OFFICE O/P EST MOD 30 MIN: CPT | Performed by: INTERNAL MEDICINE

## 2021-12-09 PROCEDURE — 1036F TOBACCO NON-USER: CPT | Performed by: INTERNAL MEDICINE

## 2021-12-09 RX ORDER — ALBUTEROL SULFATE 90 UG/1
3 AEROSOL, METERED RESPIRATORY (INHALATION) ONCE AS NEEDED
Status: CANCELLED | OUTPATIENT
Start: 2021-12-09

## 2021-12-09 RX ORDER — SODIUM CHLORIDE 9 MG/ML
20 INJECTION, SOLUTION INTRAVENOUS ONCE
Status: CANCELLED | OUTPATIENT
Start: 2021-12-09

## 2021-12-09 RX ORDER — ONDANSETRON 2 MG/ML
4 INJECTION INTRAMUSCULAR; INTRAVENOUS ONCE AS NEEDED
Status: CANCELLED | OUTPATIENT
Start: 2021-12-09

## 2021-12-09 RX ORDER — ACETAMINOPHEN 325 MG/1
650 TABLET ORAL ONCE AS NEEDED
Status: CANCELLED | OUTPATIENT
Start: 2021-12-09

## 2021-12-16 ENCOUNTER — TELEPHONE (OUTPATIENT)
Dept: INTERNAL MEDICINE CLINIC | Facility: CLINIC | Age: 61
End: 2021-12-16

## 2021-12-16 DIAGNOSIS — Z20.822 COVID-19 RULED OUT: Primary | ICD-10-CM

## 2021-12-17 DIAGNOSIS — Z20.822 COVID-19 RULED OUT: ICD-10-CM

## 2021-12-17 DIAGNOSIS — U07.1 COVID-19: Primary | ICD-10-CM

## 2021-12-17 PROCEDURE — U0003 INFECTIOUS AGENT DETECTION BY NUCLEIC ACID (DNA OR RNA); SEVERE ACUTE RESPIRATORY SYNDROME CORONAVIRUS 2 (SARS-COV-2) (CORONAVIRUS DISEASE [COVID-19]), AMPLIFIED PROBE TECHNIQUE, MAKING USE OF HIGH THROUGHPUT TECHNOLOGIES AS DESCRIBED BY CMS-2020-01-R: HCPCS | Performed by: INTERNAL MEDICINE

## 2021-12-17 PROCEDURE — U0005 INFEC AGEN DETEC AMPLI PROBE: HCPCS | Performed by: INTERNAL MEDICINE

## 2021-12-19 LAB — SARS-COV-2 RNA RESP QL NAA+PROBE: POSITIVE

## 2021-12-22 ENCOUNTER — TELEPHONE (OUTPATIENT)
Dept: INTERNAL MEDICINE CLINIC | Facility: CLINIC | Age: 61
End: 2021-12-22

## 2022-05-17 ENCOUNTER — TELEPHONE (OUTPATIENT)
Dept: INTERNAL MEDICINE CLINIC | Facility: CLINIC | Age: 62
End: 2022-05-17

## 2022-05-17 DIAGNOSIS — Z12.31 ENCOUNTER FOR SCREENING MAMMOGRAM FOR MALIGNANT NEOPLASM OF BREAST: Primary | ICD-10-CM

## 2022-05-17 DIAGNOSIS — R73.03 PREDIABETES: ICD-10-CM

## 2022-05-17 DIAGNOSIS — E78.49 OTHER HYPERLIPIDEMIA: ICD-10-CM

## 2022-05-17 NOTE — TELEPHONE ENCOUNTER
Patient calling this afternoon and stated, " I would like Dr Daniel Joel to place a referral in the system for my mammogram and order for my labs    After I have my mammogram done and the lab work I will schedule an appointment with Dr Daniel Joel '

## 2022-06-17 ENCOUNTER — HOSPITAL ENCOUNTER (EMERGENCY)
Facility: HOSPITAL | Age: 62
Discharge: HOME | End: 2022-06-17
Attending: EMERGENCY MEDICINE | Admitting: EMERGENCY MEDICINE
Payer: COMMERCIAL

## 2022-06-17 VITALS
RESPIRATION RATE: 18 BRPM | DIASTOLIC BLOOD PRESSURE: 68 MMHG | OXYGEN SATURATION: 99 % | SYSTOLIC BLOOD PRESSURE: 122 MMHG | HEART RATE: 82 BPM | BODY MASS INDEX: 30.23 KG/M2 | WEIGHT: 149.69 LBS | TEMPERATURE: 98.7 F

## 2022-06-17 DIAGNOSIS — L23.7 POISON IVY: Primary | ICD-10-CM

## 2022-06-17 PROCEDURE — 96372 THER/PROPH/DIAG INJ SC/IM: CPT

## 2022-06-17 PROCEDURE — 99282 EMERGENCY DEPT VISIT SF MDM: CPT

## 2022-06-17 PROCEDURE — 99284 EMERGENCY DEPT VISIT MOD MDM: CPT | Performed by: PHYSICIAN ASSISTANT

## 2022-06-17 RX ORDER — PREDNISONE 10 MG/1
TABLET ORAL
Qty: 84 TABLET | Refills: 0 | Status: SHIPPED | OUTPATIENT
Start: 2022-06-17

## 2022-06-17 RX ORDER — METHYLPREDNISOLONE SODIUM SUCCINATE 125 MG/2ML
60 INJECTION, POWDER, LYOPHILIZED, FOR SOLUTION INTRAMUSCULAR; INTRAVENOUS ONCE
Status: COMPLETED | OUTPATIENT
Start: 2022-06-17 | End: 2022-06-17

## 2022-06-17 RX ORDER — PREDNISONE 10 MG/1
TABLET ORAL
Qty: 20 TABLET | Refills: 0 | Status: SHIPPED | OUTPATIENT
Start: 2022-06-17 | End: 2022-06-17

## 2022-06-17 RX ADMIN — METHYLPREDNISOLONE SODIUM SUCCINATE 60 MG: 125 INJECTION, POWDER, FOR SOLUTION INTRAMUSCULAR; INTRAVENOUS at 13:37

## 2022-06-17 NOTE — ED PROVIDER NOTES
History  Chief Complaint   Patient presents with    Poison Ivy     Pt c/o rash from poison ivy for the past x 3 days  Pt denies any other medical concerns     61y  o female with no significant PMH presents to the ER for poison on her arms, face and legs for the last 3 days  Patient states she was doing yard work and believes she came into contact with poison  She has been using OTC creams for poison without relief  She describes her rash as itchy  Symptoms are constant  She denies fever, chills, URI symptoms, chest pain, dyspnea, N/V/D, abdominal pain, weakness or paresthesias  History provided by:  Patient   used: No        Prior to Admission Medications   Prescriptions Last Dose Informant Patient Reported? Taking? Diclofenac Sodium (VOLTAREN) 1 %   No No   Sig: Apply 2 g topically 4 (four) times a day      Facility-Administered Medications: None       History reviewed  No pertinent past medical history  History reviewed  No pertinent surgical history  Family History   Problem Relation Age of Onset    Hyperlipidemia Father     Hypertension Father     Diabetes Father     Heart attack Father     Diabetes Sister     Osteoporosis Sister     No Known Problems Mother     No Known Problems Daughter     No Known Problems Maternal Grandmother     No Known Problems Maternal Grandfather     No Known Problems Paternal Grandmother     No Known Problems Paternal Grandfather     No Known Problems Sister     No Known Problems Sister     No Known Problems Sister     No Known Problems Sister     No Known Problems Maternal Aunt     No Known Problems Maternal Aunt     No Known Problems Maternal Aunt     No Known Problems Paternal Aunt     No Known Problems Paternal Aunt     No Known Problems Paternal Aunt     No Known Problems Paternal Aunt     No Known Problems Paternal Aunt      I have reviewed and agree with the history as documented      E-Cigarette/Vaping    E-Cigarette Use Never User      E-Cigarette/Vaping Substances     Social History     Tobacco Use    Smoking status: Never Smoker    Smokeless tobacco: Never Used   Vaping Use    Vaping Use: Never used   Substance Use Topics    Alcohol use: No    Drug use: No       Review of Systems   Constitutional: Negative for activity change, appetite change, chills and fever  HENT: Negative for congestion, drooling, ear discharge, ear pain, facial swelling, rhinorrhea and sore throat  Eyes: Negative for redness  Respiratory: Negative for cough and shortness of breath  Cardiovascular: Negative for chest pain  Gastrointestinal: Negative for abdominal pain, diarrhea, nausea and vomiting  Musculoskeletal: Negative for neck stiffness  Skin: Positive for rash  Allergic/Immunologic: Negative for food allergies  Neurological: Negative for weakness and numbness  Physical Exam  Physical Exam  Vitals and nursing note reviewed  Constitutional:       General: She is not in acute distress  Appearance: She is not toxic-appearing  HENT:      Head: Normocephalic and atraumatic  Eyes:      Conjunctiva/sclera: Conjunctivae normal    Neck:      Trachea: No tracheal deviation  Cardiovascular:      Rate and Rhythm: Normal rate  Pulmonary:      Effort: Pulmonary effort is normal  No respiratory distress  Abdominal:      General: There is no distension  Musculoskeletal:      Cervical back: Normal range of motion and neck supple  Skin:     General: Skin is warm and dry  Findings: Rash present  Rash is vesicular  Comments: Please refer to picture below  Neurological:      Mental Status: She is alert  GCS: GCS eye subscore is 4  GCS verbal subscore is 5  GCS motor subscore is 6                                Vital Signs  ED Triage Vitals [06/17/22 1101]   Temperature Pulse Respirations Blood Pressure SpO2   98 7 °F (37 1 °C) 82 18 122/68 99 %      Temp src Heart Rate Source Patient Position - Orthostatic VS BP Location FiO2 (%)   -- Monitor Sitting Right arm --      Pain Score       4           Vitals:    06/17/22 1101   BP: 122/68   Pulse: 82   Patient Position - Orthostatic VS: Sitting         Visual Acuity      ED Medications  Medications   methylPREDNISolone sodium succinate (Solu-MEDROL) injection 60 mg (60 mg Intramuscular Given 6/17/22 1337)       Diagnostic Studies  Results Reviewed     None                 No orders to display              Procedures  Procedures         ED Course                               SBIRT 22yo+    Flowsheet Row Most Recent Value   SBIRT (23 yo +)    In order to provide better care to our patients, we are screening all of our patients for alcohol and drug use  Would it be okay to ask you these screening questions? No Filed at: 06/17/2022 1248                    MDM  Number of Diagnoses or Management Options  Poison ivy: new and does not require workup  Diagnosis management comments: DDX consists of but not limited to: poison ivy, dermatitis    Will give Solumedrol here and discharge home with oral steroid taper  The management plan was discussed in detail with the patient at bedside and all questions were answered  Prior to discharge, we provided both verbal and written instructions  We discussed with the patient the signs and symptoms for which to return to the emergency department  All questions were answered and patient was comfortable with the plan of care and discharged to home  Instructed the patient to follow up with the primary care provider and/or specialist provided and their written instructions  The patient verbalized understanding of our discussion and plan of care, and agrees to return to the Emergency Department for concerns and progression of illness      At discharge, I instructed the patient to:  -follow up with pcp  -take Prednisone as prescribed  -take Benadryl for itching  -keep area clean  -watch for signs of infection: redness, swelling or discharge  -return to the ER if symptoms worsened or new symptoms arose  Patient agreed to this plan and was stable at time of discharge  Patient Progress  Patient progress: stable      Disposition  Final diagnoses:   Poison ivy     Time reflects when diagnosis was documented in both MDM as applicable and the Disposition within this note     Time User Action Codes Description Comment    6/17/2022  1:03 PM Dannie Small Add [L23 7] Poison ivy       ED Disposition     ED Disposition   Discharge    Condition   Stable    Date/Time   Fri Jun 17, 2022  1:03 PM    Comment   Noni Sanchezroom discharge to home/self care  Follow-up Information     Follow up With Specialties Details Why Contact Felix Brantley DO Internal Medicine Schedule an appointment as soon as possible for a visit in 1 day  [address] [facility]  [address]  [phone]            Discharge Medication List as of 6/17/2022  1:29 PM      START taking these medications    Details   predniSONE 10 mg tablet Take 6 tablets for 4 days then 5 tablets for 4 days then 4 tablets for 4 days then 3 tablets for 4 days then 2 tablets for 4 days then 1 tablet for 4 days, Normal         CONTINUE these medications which have NOT CHANGED    Details   Diclofenac Sodium (VOLTAREN) 1 % Apply 2 g topically 4 (four) times a day, Starting Wed 4/21/2021, Normal             No discharge procedures on file      PDMP Review     None          ED Provider  Electronically Signed by           Evangelina Valdez PA-C  06/17/22 [address], PA-C  06/17/22 0341

## 2022-06-17 NOTE — DISCHARGE INSTRUCTIONS
DISCHARGE INSTRUCTIONS:    FOLLOW UP WITH YOUR PRIMARY CARE PROVIDER OR THE [address] [place] [address]  MAKE AN APPOINTMENT TO BE SEEN  TAKE MEDICATION AS PRESCRIBED  IF RASH, SHORTNESS OF BREATH OR TROUBLE SWALLOWING, STOP TAKING THE MEDICATION AND BE SEEN  TAKE BENADRYL FOR ITCHING     WATCH FOR SIGNS OF INFECTION: REDNESS, SWELLING OR DISCHARGE     IF SYMPTOMS WORSEN OR NEW SYMPTOMS ARISE, RETURN TO THE ER TO BE SEEN

## 2022-06-20 ENCOUNTER — VBI (OUTPATIENT)
Dept: ADMINISTRATIVE | Facility: OTHER | Age: 62
End: 2022-06-20

## 2022-06-20 NOTE — LETTER
[facility]  [address] [facility] [address]    Date: 06/21/22  Justina Bonner  [address]  [place]   [address]  [phone]    Dear Yogesh Sams:                                                                                                                              Thank you for choosing [facility] emergency department for care  As your primary care provider we want to make sure that your ongoing medical care is being addressed  If you require follow up care as a result of your emergency department visit, there are a few things we would like you to know  As part of our continuing commitment to caring for our patient, we have added more same day appointments and have extended our office hours to meet your medical needs  After hours, on-call physicians are available via our main office line  We encourage you to contact our office prior to seeking treatment to discuss your symptoms with our medical staff  Together, we can determine the correct course of action  A majority of non-emergent conditions such as: common cold, flu-like symptoms, fevers, strains/sprains, dislocations, minor burns, cuts and animal bites can be treated at [facility] facilities  Diagnostic testing is available at some sites  Of course, if you are experiencing a life threatening medical emergency call 911 or proceed directly to the nearest emergency room  Your nearest [facility] facility is conveniently located at:    [facility] offered at most Care Now locations  [employer] your spot online at www [email or web address] or on the [facility]    Sincerely,    [facility]  No information on file

## 2022-06-20 NOTE — TELEPHONE ENCOUNTER
Frances Seo    ED Visit Information     Ed visit date: 6/17/2022  Diagnosis Description: Poison IVY  In Network? Yes [facility]  Discharge status: Home  Discharged with meds ? No  Number of ED visits to date: 1  ED Severity:4     Outreach Information    Outreach successful: No 2  Date letter mailed:my chart   Date Finalized: 6/21/2022    Care Coordination    Follow up appointment with pcp: no n/a  Transportation issues ?  NA    [facility] type: 3 Day Outreach        06/20/2022 09:48 AM Phone (VBI) Lorraine Wright (Self) [phone] (M) Remove  Left Message    By Neymar Hensonch, MA

## 2022-08-16 ENCOUNTER — HOSPITAL ENCOUNTER (OUTPATIENT)
Dept: MAMMOGRAPHY | Facility: MEDICAL CENTER | Age: 62
Discharge: HOME | End: 2022-08-16
Payer: COMMERCIAL

## 2022-08-16 VITALS — HEIGHT: 59 IN | BODY MASS INDEX: 30.04 KG/M2 | WEIGHT: 149 LBS

## 2022-08-16 DIAGNOSIS — Z12.31 ENCOUNTER FOR SCREENING MAMMOGRAM FOR MALIGNANT NEOPLASM OF BREAST: ICD-10-CM

## 2022-08-16 PROCEDURE — 77063 BREAST TOMOSYNTHESIS BI: CPT

## 2022-08-16 PROCEDURE — 77067 SCR MAMMO BI INCL CAD: CPT

## 2022-11-25 ENCOUNTER — RA CDI HCC (OUTPATIENT)
Dept: OTHER | Facility: HOSPITAL | Age: 62
End: 2022-11-25

## 2022-11-25 NOTE — PROGRESS NOTES
[facility]  coding opportunities       Chart reviewed, no opportunity found: CHART REVIEWED, NO OPPORTUNITY FOUND        Patients Insurance

## 2022-11-30 ENCOUNTER — APPOINTMENT (OUTPATIENT)
Dept: LAB | Facility: MEDICAL CENTER | Age: 62
End: 2022-11-30

## 2022-11-30 DIAGNOSIS — R73.03 PREDIABETES: ICD-10-CM

## 2022-11-30 LAB
ALBUMIN SERPL BCP-MCNC: 3.9 G/DL (ref 3.5–5)
ALP SERPL-CCNC: 57 U/L (ref 46–116)
ALT SERPL W P-5'-P-CCNC: 30 U/L (ref 12–78)
ANION GAP SERPL CALCULATED.3IONS-SCNC: 8 MMOL/L (ref 4–13)
AST SERPL W P-5'-P-CCNC: 20 U/L (ref 5–45)
BASOPHILS # BLD AUTO: 0.04 THOUSANDS/ÂΜL (ref 0–0.1)
BASOPHILS NFR BLD AUTO: 1 % (ref 0–1)
BILIRUB SERPL-MCNC: 0.27 MG/DL (ref 0.2–1)
BUN SERPL-MCNC: 14 MG/DL (ref 5–25)
CALCIUM SERPL-MCNC: 9.2 MG/DL (ref 8.3–10.1)
CHLORIDE SERPL-SCNC: 112 MMOL/L (ref 96–108)
CHOLEST SERPL-MCNC: 203 MG/DL
CO2 SERPL-SCNC: 21 MMOL/L (ref 21–32)
CREAT SERPL-MCNC: 0.65 MG/DL (ref 0.6–1.3)
EOSINOPHIL # BLD AUTO: 0.15 THOUSAND/ÂΜL (ref 0–0.61)
EOSINOPHIL NFR BLD AUTO: 3 % (ref 0–6)
ERYTHROCYTE [DISTWIDTH] IN BLOOD BY AUTOMATED COUNT: 13.4 % (ref 11.6–15.1)
GFR SERPL CREATININE-BSD FRML MDRD: 96 ML/MIN/1.73SQ M
GLUCOSE P FAST SERPL-MCNC: 90 MG/DL (ref 65–99)
HCT VFR BLD AUTO: 38.1 % (ref 34.8–46.1)
HDLC SERPL-MCNC: 72 MG/DL
HGB BLD-MCNC: 11.8 G/DL (ref 11.5–15.4)
IMM GRANULOCYTES # BLD AUTO: 0.01 THOUSAND/UL (ref 0–0.2)
IMM GRANULOCYTES NFR BLD AUTO: 0 % (ref 0–2)
LDLC SERPL CALC-MCNC: 112 MG/DL (ref 0–100)
LYMPHOCYTES # BLD AUTO: 1.92 THOUSANDS/ÂΜL (ref 0.6–4.47)
LYMPHOCYTES NFR BLD AUTO: 41 % (ref 14–44)
MCH RBC QN AUTO: 26.2 PG (ref 26.8–34.3)
MCHC RBC AUTO-ENTMCNC: 31 G/DL (ref 31.4–37.4)
MCV RBC AUTO: 85 FL (ref 82–98)
MONOCYTES # BLD AUTO: 0.41 THOUSAND/ÂΜL (ref 0.17–1.22)
MONOCYTES NFR BLD AUTO: 9 % (ref 4–12)
NEUTROPHILS # BLD AUTO: 2.13 THOUSANDS/ÂΜL (ref 1.85–7.62)
NEUTS SEG NFR BLD AUTO: 46 % (ref 43–75)
NONHDLC SERPL-MCNC: 131 MG/DL
NRBC BLD AUTO-RTO: 0 /100 WBCS
PLATELET # BLD AUTO: 234 THOUSANDS/UL (ref 149–390)
PMV BLD AUTO: 9.9 FL (ref 8.9–12.7)
POTASSIUM SERPL-SCNC: 4.2 MMOL/L (ref 3.5–5.3)
PROT SERPL-MCNC: 7.3 G/DL (ref 6.4–8.4)
RBC # BLD AUTO: 4.5 MILLION/UL (ref 3.81–5.12)
SODIUM SERPL-SCNC: 141 MMOL/L (ref 135–147)
TRIGL SERPL-MCNC: 93 MG/DL
TSH SERPL DL<=0.05 MIU/L-ACNC: 2.53 UIU/ML (ref 0.45–4.5)
WBC # BLD AUTO: 4.66 THOUSAND/UL (ref 4.31–10.16)

## 2022-11-30 NOTE — PROGRESS NOTES
INTERNAL MEDICINE OFFICE VISIT  [facility] Internal Medicine- [place]    NAME: Peter Flores  AGE: 62 y o  SEX: female    DATE OF ENCOUNTER: 12/5/2022    Assessment and Plan/History of Present Illness     Here today for two-month follow-up  medical history of prediabetes, Chikungunya infection with resulting arthritis    She does not have any significant concerns today    We reviewed her most recent set of labs which were relatively unremarkable  Consistent with prediabetes, A1c trend has been stable  Slight improvement in LDL noted    She is to return to the office in 1 year for annual physical, sooner if needed      1  Screening for cervical cancer    2  Screening for colorectal cancer  -she is agreeable to referral for screening colonoscopy  - Ambulatory referral for colonoscopy; Future    3  Encounter for immunization  - FLUBLOK: influenza vaccine, quadrivalent, recombinant, PF, 0 5 mL    4  Prediabetes  -stable, most recent A1c of 5 7  - Hemoglobin A1C; Future    5  Overweight (BMI 25 0-29 9)  -healthy dietary and lifestyle choices encouraged  - CBC and differential; Future  - Comprehensive metabolic panel; Future  - Lipid panel; Future  - TSH, 3rd generation with Free T4 reflex;  Future              Orders Placed This Encounter   Procedures   • FLUBLOK: influenza vaccine, quadrivalent, recombinant, PF, 0 5 mL   • CBC and differential   • Comprehensive metabolic panel   • Lipid panel   • TSH, 3rd generation with Free T4 reflex   • Hemoglobin A1C   • Ambulatory referral for colonoscopy       Chief Complaint     Chief Complaint   Patient presents with   • Follow-up       Review of Systems     10 point ROS negative except per HPI    The following portions of the patient's history were reviewed and updated as appropriate: allergies, current medications, past family history, past medical history, past social history, past surgical history and problem list     Active Problem List     Patient Active Problem List Diagnosis   • Overweight (BMI 25 0-29  9)   • Other hyperlipidemia   • Prediabetes   • Primary osteoarthritis of right knee   • COVID-19       Objective     /70 (BP Location: Left arm, Patient Position: Sitting, Cuff Size: Standard)   Pulse 80   Temp 97 7 °F (36 5 °C)   Resp 13   Ht 4' 11" (1 499 m)   Wt 68 kg (150 lb)   BMI 30 30 kg/m²     Physical Exam  Constitutional:       Appearance: Normal appearance  She is not ill-appearing  HENT:      Head: Normocephalic and atraumatic  Eyes:      General: No scleral icterus  Right eye: No discharge  Left eye: No discharge  Cardiovascular:      Rate and Rhythm: Normal rate and regular rhythm  Heart sounds: No murmur heard  No friction rub  Pulmonary:      Effort: Pulmonary effort is normal       Breath sounds: Normal breath sounds  No wheezing or rales  Abdominal:      General: Abdomen is flat  There is no distension  Palpations: Abdomen is soft  Tenderness: There is no abdominal tenderness  Musculoskeletal:         General: No swelling or tenderness  Skin:     General: Skin is warm and dry  Findings: No erythema  Neurological:      Mental Status: She is alert  Mental status is at baseline  Motor: No weakness  Psychiatric:         Mood and Affect: Mood normal          Behavior: Behavior normal          Pertinent Laboratory/Diagnostic Studies:  Mammo screening bilateral w 3d & cad    Result Date: 8/17/2022  Impression: No mammographic evidence of malignancy  No significant change ASSESSMENT/BI-RADS CATEGORY: Left: 1 - Negative Right: 1 - Negative Overall: 1 - Negative RECOMMENDATION:      - Routine screening mammogram in 1 year for both breasts  Workstation ID: [id number]       Images and diagnostics reviewed     Current Medications   No current outpatient medications on file      Health Maintenance     Health Maintenance   Topic Date Due   • Cervical Cancer Screening  Never done   • Osteoporosis Screening  Never done   • Annual Physical  08/13/2020   • Colorectal Cancer Screening  08/14/2020   • COVID-19 Vaccine (3 - Booster for [place] series) 05/14/2022   • BMI: Followup Plan  09/23/2022   • Depression Screening  12/09/2022   • Breast Cancer Screening: Mammogram  08/16/2023   • BMI: Adult  12/01/2023   • DTaP,Tdap,and Td Vaccines (2 - Td or Tdap) 10/06/2024   • HIV Screening  Completed   • Hepatitis C Screening  Completed   • Hepatitis B Vaccine  Completed   • Influenza Vaccine  Completed   • Pneumococcal Vaccine: Pediatrics (0 to 5 Years) and At-Risk Patients (6 to 59 Years)  Aged Out   • HIB Vaccine  Aged Out   • IPV Vaccine  Aged Out   • Hepatitis A Vaccine  Aged Out   • Meningococcal ACWY Vaccine  Aged Out   • HPV Vaccine  Aged [employer] History   Administered Date(s) Administered   • COVID-19 J&J ([place]) vaccine 0 5 mL 03/17/2021, 03/17/2021   • COVID-19 MODERNA VACC 0 5 ML IM 01/14/2022   • Hep B, adult 11/10/2014, 12/22/2014, 06/04/2015   • Influenza, recombinant, quadrivalent,injectable, preservative free 09/22/2020, 12/01/2022   • Tdap 10/06/2014   • Tuberculin Skin Test-PPD Intradermal 08/13/2019       ROSE Martinez    [facility] Internal Medicine - [place]  [address], [facility] [address]  [place], [address]   Office: [phone]  Fax: [phone]

## 2022-12-01 ENCOUNTER — OFFICE VISIT (OUTPATIENT)
Dept: INTERNAL MEDICINE CLINIC | Facility: CLINIC | Age: 62
End: 2022-12-01

## 2022-12-01 VITALS
WEIGHT: 150 LBS | SYSTOLIC BLOOD PRESSURE: 110 MMHG | RESPIRATION RATE: 13 BRPM | HEART RATE: 80 BPM | TEMPERATURE: 97.7 F | HEIGHT: 59 IN | BODY MASS INDEX: 30.24 KG/M2 | DIASTOLIC BLOOD PRESSURE: 70 MMHG

## 2022-12-01 DIAGNOSIS — Z12.12 SCREENING FOR COLORECTAL CANCER: ICD-10-CM

## 2022-12-01 DIAGNOSIS — R73.03 PREDIABETES: ICD-10-CM

## 2022-12-01 DIAGNOSIS — Z12.4 SCREENING FOR CERVICAL CANCER: ICD-10-CM

## 2022-12-01 DIAGNOSIS — Z23 ENCOUNTER FOR IMMUNIZATION: Primary | ICD-10-CM

## 2022-12-01 DIAGNOSIS — Z12.11 SCREENING FOR COLORECTAL CANCER: ICD-10-CM

## 2022-12-01 DIAGNOSIS — E66.3 OVERWEIGHT (BMI 25.0-29.9): ICD-10-CM

## 2022-12-01 LAB
EST. AVERAGE GLUCOSE BLD GHB EST-MCNC: 117 MG/DL
HBA1C MFR BLD: 5.7 %

## 2023-01-25 ENCOUNTER — PREP FOR PROCEDURE (OUTPATIENT)
Dept: GASTROENTEROLOGY | Facility: CLINIC | Age: 63
End: 2023-01-25

## 2023-01-25 ENCOUNTER — TELEPHONE (OUTPATIENT)
Dept: GASTROENTEROLOGY | Facility: CLINIC | Age: 63
End: 2023-01-25

## 2023-01-25 DIAGNOSIS — Z12.11 SCREENING FOR COLON CANCER: Primary | ICD-10-CM

## 2023-01-25 NOTE — TELEPHONE ENCOUNTER
Scheduled date of colonoscopy (as of today):3/6/23    Physician performing colonoscopy:Dr Munguia    Location of colonoscopy:[place]    Clearances: N/A

## 2023-02-06 ENCOUNTER — OFFICE VISIT (OUTPATIENT)
Dept: INTERNAL MEDICINE CLINIC | Facility: CLINIC | Age: 63
End: 2023-02-06

## 2023-02-06 VITALS
SYSTOLIC BLOOD PRESSURE: 130 MMHG | OXYGEN SATURATION: 98 % | RESPIRATION RATE: 16 BRPM | DIASTOLIC BLOOD PRESSURE: 72 MMHG | HEART RATE: 94 BPM | TEMPERATURE: 97.1 F | BODY MASS INDEX: 30.64 KG/M2 | WEIGHT: 152 LBS | HEIGHT: 59 IN

## 2023-02-06 DIAGNOSIS — J02.9 PHARYNGITIS, UNSPECIFIED ETIOLOGY: Primary | ICD-10-CM

## 2023-02-06 DIAGNOSIS — R52 BODY ACHES: ICD-10-CM

## 2023-02-06 NOTE — LETTER
February 6, 2023     Patient: Lesvia Arguelles  YOB: 1960  Date of Visit: 2/6/2023      To Whom it May Concern:    Lesvia Arguelles is under my professional care  Lalo Thurman was seen in my office on 2/6/2023  Lalo Thurman may return to school on Wednesday, 2/8/2023  If you have any questions or concerns, please don't hesitate to call           Sincerely,          Garrett Rogers, DO        CC: No Recipients

## 2023-02-06 NOTE — PROGRESS NOTES
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  Denies any respiratory issues    On exam, no evidence of tender lymphadenopathy, tonsillar exudate  Lungs are clear to auscultation  She is in no acute distress    1  Body aches  -Likely upper respiratory tract infection/pharyngitis  We discussed self-limiting nature of this condition  She had a negative rapid COVID test prior to today's appointment and several negative COVID tests at home  Recommend ongoing symptomatic management  Provided her with a work note for her to return to school tentatively this Wednesday, 2/8/2023    - POCT Rapid Covid Ag    2  Pharyngitis, unspecified etiology      BMI Counseling: Body mass index is 30 7 kg/m²  The BMI is above normal  Nutrition recommendations include decreasing portion sizes, encouraging healthy choices of fruits and vegetables, moderation in carbohydrate intake and increasing intake of lean protein  Exercise recommendations include moderate physical activity 150 minutes/week  Rationale for BMI follow-up plan is due to patient being overweight or obese            Orders Placed This Encounter   Procedures   • POCT Rapid Covid Ag       Chief Complaint     Chief Complaint   Patient presents with   • Generalized Body Aches       Review of Systems     10 point ROS negative except per HPI    The following portions of the patient's history were reviewed and updated as appropriate: allergies, current medications, past family history, past medical history, past social history, past surgical history and problem list     Active Problem List     Patient Active Problem List   Diagnosis   • Overweight (BMI 25 0-29  9)   • Other hyperlipidemia   • Prediabetes   • Primary osteoarthritis of right knee   • COVID-19       Objective     /72 (BP Location: Left arm, Patient Position: Sitting, Cuff Size: Standard)   Pulse 94   Temp (!) 97 1 °F (36 2 °C)   Resp 16   Ht 4' 11" (1 499 m)   Wt 68 9 kg (152 lb)   SpO2 98%   BMI 30 70 kg/m²     Physical Exam  Constitutional:       Appearance: Normal appearance  She is not ill-appearing  HENT:      Head: Normocephalic and atraumatic  Eyes:      General: No scleral icterus  Right eye: No discharge  Left eye: No discharge  Cardiovascular:      Rate and Rhythm: Normal rate and regular rhythm  Heart sounds: No murmur heard  No friction rub  Pulmonary:      Effort: Pulmonary effort is normal       Breath sounds: Normal breath sounds  No wheezing or rales  Abdominal:      General: Abdomen is flat  There is no distension  Palpations: Abdomen is soft  Tenderness: There is no abdominal tenderness  Musculoskeletal:         General: No swelling or tenderness  Skin:     General: Skin is warm and dry  Findings: No erythema  Neurological:      Mental Status: She is alert  Mental status is at baseline  Motor: No weakness  Psychiatric:         Mood and Affect: Mood normal          Behavior: Behavior normal          Pertinent Laboratory/Diagnostic Studies:  Mammo screening bilateral w 3d & cad    Result Date: 8/17/2022  Impression: No mammographic evidence of malignancy  No significant change ASSESSMENT/BI-RADS CATEGORY: Left: 1 - Negative Right: 1 - Negative Overall: 1 - Negative RECOMMENDATION:      - Routine screening mammogram in 1 year for both breasts   Workstation ID: [id number]       Images and diagnostics reviewed     Current Medications   No current outpatient medications on file  Health Maintenance     Health Maintenance   Topic Date Due   • Cervical Cancer Screening  Never done   • Osteoporosis Screening  Never done   • Annual Physical  08/13/2020   • Colorectal Cancer Screening  08/14/2020   • COVID-19 Vaccine (3 - Booster for [place] series) 03/11/2022   • Breast Cancer Screening: Mammogram  08/16/2023   • Depression Screening  02/06/2024   • BMI: Followup Plan  02/06/2024   • BMI: Adult  02/06/2024   • DTaP,Tdap,and Td Vaccines (2 - Td or Tdap) 10/06/2024   • HIV Screening  Completed   • Hepatitis C Screening  Completed   • Influenza Vaccine  Completed   • Pneumococcal Vaccine: Pediatrics (0 to 5 Years) and At-Risk Patients (6 to 59 Years)  Aged Out   • HIB Vaccine  Aged Out   • IPV Vaccine  Aged Out   • Hepatitis A Vaccine  Aged Out   • Meningococcal ACWY Vaccine  Aged Out   • HPV Vaccine  Aged [employer] History   Administered Date(s) Administered   • COVID-19 J&J ([place]) vaccine 0 5 mL 03/17/2021, 03/17/2021   • COVID-19 MODERNA VACC 0 5 ML IM 01/14/2022   • Hep B, adult 11/10/2014, 12/22/2014, 06/04/2015   • INFLUENZA 12/01/2022   • Influenza, recombinant, quadrivalent,injectable, preservative free 09/22/2020, 12/01/2022   • Tdap 10/06/2014   • Tuberculin Skin Test-PPD Intradermal 08/13/2019       ROSE Guaman  Internal Medicine [place] [address], [facility] [address]  [place], [address] [facility]  Office: [phone]  Fax: [phone]

## 2023-02-09 LAB
SARS-COV-2 AG UPPER RESP QL IA: NEGATIVE
VALID CONTROL: NORMAL

## 2023-02-27 ENCOUNTER — TELEPHONE (OUTPATIENT)
Dept: GASTROENTEROLOGY | Facility: MEDICAL CENTER | Age: 63
End: 2023-02-27

## 2023-03-03 RX ORDER — ONDANSETRON 2 MG/ML
4 INJECTION INTRAMUSCULAR; INTRAVENOUS ONCE AS NEEDED
OUTPATIENT
Start: 2023-03-03

## 2023-03-03 RX ORDER — ALBUTEROL SULFATE 2.5 MG/3ML
2.5 SOLUTION RESPIRATORY (INHALATION) ONCE AS NEEDED
OUTPATIENT
Start: 2023-03-03

## 2023-03-03 RX ORDER — SODIUM CHLORIDE 9 MG/ML
125 INJECTION, SOLUTION INTRAVENOUS CONTINUOUS
OUTPATIENT
Start: 2023-03-03

## 2023-03-04 ENCOUNTER — TELEPHONE (OUTPATIENT)
Dept: OTHER | Facility: OTHER | Age: 63
End: 2023-03-04

## 2023-03-04 NOTE — TELEPHONE ENCOUNTER
PT  Called in to notify she is sick with a cold and unable to attend her colonoscopy appointment  Please cancel and call back to reschedule

## 2023-03-13 ENCOUNTER — TELEPHONE (OUTPATIENT)
Dept: INTERNAL MEDICINE CLINIC | Facility: CLINIC | Age: 63
End: 2023-03-13

## 2023-03-13 DIAGNOSIS — Z01.00 VISIT FOR EYE AND VISION EXAM: Primary | ICD-10-CM

## 2023-03-13 NOTE — TELEPHONE ENCOUNTER
Patient calling this am and stated, " I would lke Dr Mendoza Huynh to place a referral to an eye Dr in the system for me "

## 2023-03-20 ENCOUNTER — TELEPHONE (OUTPATIENT)
Dept: GASTROENTEROLOGY | Facility: CLINIC | Age: 63
End: 2023-03-20

## 2023-04-05 ENCOUNTER — OFFICE VISIT (OUTPATIENT)
Dept: INTERNAL MEDICINE CLINIC | Facility: CLINIC | Age: 63
End: 2023-04-05

## 2023-04-05 VITALS
OXYGEN SATURATION: 98 % | BODY MASS INDEX: 30.84 KG/M2 | TEMPERATURE: 98.8 F | HEIGHT: 59 IN | HEART RATE: 78 BPM | WEIGHT: 153 LBS | DIASTOLIC BLOOD PRESSURE: 78 MMHG | SYSTOLIC BLOOD PRESSURE: 110 MMHG

## 2023-04-05 DIAGNOSIS — M25.531 RIGHT WRIST PAIN: ICD-10-CM
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
## 2023-04-05 DIAGNOSIS — M79.605 LEFT LEG PAIN: ICD-10-CM

## 2023-04-05 NOTE — ASSESSMENT & PLAN NOTE
Patient recently took a trip to [place]  On March 10 she was walking on flat sidewalk, tripped and broke her fall with her right wrist and right forehead area  She went to urgent care the following morning  She was diagnosed with contusion of the right wrist, x-rays were negative  She was also noted to have periorbital contusion and swelling which she has pictures of on her phone today  She did not have any head imaging  Her right eye swelling and bruising appear to be resolved today  She denies any residual issues with confusion, disorientation, visual disturbances  She has had persistent pain and swelling in the right wrist   She also has left lateral leg pain, left anterior thigh pain and pain in the left lateral lower leg which she thinks may be attributable to a pulled muscle during her fall  She initially was limping but seems to be ambulating without difficulty at this time  Has been taking ibuprofen as needed    Plan:  -Given persistent swelling and discomfort in right wrist would like to reevaluate with radiographs to look for fracture that was not identified on initial imaging    May continue with icing, compression as needed, ibuprofen as needed  -I think course of physical therapy would be beneficial to address her right wrist issues and her left leg discomfort

## 2023-04-05 NOTE — PROGRESS NOTES
INTERNAL MEDICINE OFFICE VISIT  [facility] Internal Medicine- [place]    NAME: Gamaliel Doherty  AGE: 65 y o  SEX: female    DATE OF ENCOUNTER: 4/5/2023    Assessment and Plan/History of Present Illness     Here today for same-day appointment  Medical history of prediabetes, Chikungunya infection with resulting arthritis, hyperlipidemia      1  Fall, initial encounter  Assessment & Plan:  Patient recently took a trip to [place]  On March 10 she was walking on flat sidewalk, tripped and broke her fall with her right wrist and right forehead area  She went to urgent care the following morning  She was diagnosed with contusion of the right wrist, x-rays were negative  She was also noted to have periorbital contusion and swelling which she has pictures of on her phone today  She did not have any head imaging  Her right eye swelling and bruising appear to be resolved today  She denies any residual issues with confusion, disorientation, visual disturbances  She has had persistent pain and swelling in the right wrist   She also has left lateral leg pain, left anterior thigh pain and pain in the left lateral lower leg which she thinks may be attributable to a pulled muscle during her fall  She initially was limping but seems to be ambulating without difficulty at this time  Has been taking ibuprofen as needed    Plan:  -Given persistent swelling and discomfort in right wrist would like to reevaluate with radiographs to look for fracture that was not identified on initial imaging  May continue with icing, compression as needed, ibuprofen as needed  -I think course of physical therapy would be beneficial to address her right wrist issues and her left leg discomfort    Orders:  -     XR wrist 3+ vw right; Future; Expected date: 04/05/2023  -     XR hand 3+ vw right; Future; Expected date: 04/05/2023    2   Right wrist pain  -     XR wrist 3+ vw right; Future; Expected date: 04/05/2023  -     XR hand 3+ vw right; "Future; Expected date: 04/05/2023  -     Ambulatory Referral to Physical Therapy; Future    3  Left leg pain  -     Ambulatory Referral to Physical Therapy; Future              Orders Placed This Encounter   Procedures   • XR wrist 3+ vw right   • XR hand 3+ vw right   • Ambulatory Referral to Physical Therapy       Chief Complaint     Chief Complaint   Patient presents with   • Greg Shaper March 10th in [place],  c/o R hand pain and some swelling, L leg pain       Review of Systems     10 point ROS negative except per HPI    The following portions of the patient's history were reviewed and updated as appropriate: allergies, current medications, past family history, past medical history, past social history, past surgical history and problem list     Active Problem List     Patient Active Problem List   Diagnosis   • Overweight (BMI 25 0-29  9)   • Other hyperlipidemia   • Prediabetes   • Primary osteoarthritis of right knee   • COVID-19   • Fall       Objective     /78 (BP Location: Left arm, Patient Position: Sitting, Cuff Size: Large)   Pulse 78   Temp 98 8 °F (37 1 °C) (Tympanic)   Ht 4' 11\" (1 499 m)   Wt 69 4 kg (153 lb)   SpO2 98%   BMI 30 90 kg/m²     Physical Exam  Constitutional:       Appearance: Normal appearance  She is not ill-appearing  HENT:      Head: Normocephalic and atraumatic  Eyes:      General: No scleral icterus  Right eye: No discharge  Left eye: No discharge  Cardiovascular:      Rate and Rhythm: Normal rate and regular rhythm  Heart sounds: No murmur heard  No friction rub  Pulmonary:      Effort: Pulmonary effort is normal       Breath sounds: Normal breath sounds  No wheezing or rales  Abdominal:      General: Abdomen is flat  There is no distension  Palpations: Abdomen is soft  Tenderness: There is no abdominal tenderness  Musculoskeletal:         General: Swelling and tenderness present  Skin:     General: Skin is warm and dry   " Findings: No erythema  Neurological:      Mental Status: She is alert  Mental status is at baseline  Motor: No weakness  Psychiatric:         Mood and Affect: Mood normal          Behavior: Behavior normal          Pertinent Laboratory/Diagnostic Studies:  Mammo screening bilateral w 3d & cad    Result Date: 8/17/2022  Impression: No mammographic evidence of malignancy  No significant change ASSESSMENT/BI-RADS CATEGORY: Left: 1 - Negative Right: 1 - Negative Overall: 1 - Negative RECOMMENDATION:      - Routine screening mammogram in 1 year for both breasts  Workstation ID: [id number]       Images and diagnostics reviewed     Current Medications   No current outpatient medications on file      Health Maintenance     Health Maintenance   Topic Date Due   • Cervical Cancer Screening  Never done   • Annual Physical  08/13/2020   • Colorectal Cancer Screening  08/14/2020   • COVID-19 Vaccine (4 - Booster for [place] series) 03/11/2022   • Breast Cancer Screening: Mammogram  08/16/2023   • Depression Screening  02/06/2024   • BMI: Followup Plan  02/06/2024   • BMI: Adult  04/05/2024   • DTaP,Tdap,and Td Vaccines (2 - Td or Tdap) 10/06/2024   • HIV Screening  Completed   • Hepatitis C Screening  Completed   • Influenza Vaccine  Completed   • Pneumococcal Vaccine: Pediatrics (0 to 5 Years) and At-Risk Patients (6 to 59 Years)  Aged Out   • HIB Vaccine  Aged Out   • IPV Vaccine  Aged Out   • Hepatitis A Vaccine  Aged Out   • Meningococcal ACWY Vaccine  Aged Out   • HPV Vaccine  Aged [employer] History   Administered Date(s) Administered   • COVID-19 J&J ([place]) vaccine 0 5 mL 03/17/2021, 03/17/2021   • COVID-19 MODERNA VACC 0 5 ML IM 01/14/2022   • Hep B, adult 11/10/2014, 12/22/2014, 06/04/2015   • INFLUENZA 12/01/2022   • Influenza, recombinant, quadrivalent,injectable, preservative free 09/22/2020, 12/01/2022   • Tdap 10/06/2014   • Tuberculin Skin Test-PPD Intradermal 08/13/2019       Max Eagle Galvan  Internal Medicine [facility]  [address], [facility] [address]  [place], [address] [facility]  Office: [phone]  Fax: [phone]

## 2023-04-06 ENCOUNTER — OFFICE VISIT (OUTPATIENT)
Dept: OBGYN CLINIC | Facility: CLINIC | Age: 63
End: 2023-04-06

## 2023-04-06 ENCOUNTER — TELEPHONE (OUTPATIENT)
Dept: OBGYN CLINIC | Facility: HOSPITAL | Age: 63
End: 2023-04-06

## 2023-04-06 VITALS
HEIGHT: 59 IN | DIASTOLIC BLOOD PRESSURE: 72 MMHG | BODY MASS INDEX: 30.72 KG/M2 | SYSTOLIC BLOOD PRESSURE: 110 MMHG | WEIGHT: 152.4 LBS

## 2023-04-06 DIAGNOSIS — S63.501A SPRAIN OF RIGHT WRIST, INITIAL ENCOUNTER: ICD-10-CM

## 2023-04-06 DIAGNOSIS — S62.346A CLOSED NONDISPLACED FRACTURE OF BASE OF FIFTH METACARPAL BONE OF RIGHT HAND, INITIAL ENCOUNTER: Primary | ICD-10-CM

## 2023-04-06 RX ORDER — IBUPROFEN 400 MG/1
TABLET ORAL EVERY 6 HOURS PRN
COMMUNITY

## 2023-04-06 NOTE — TELEPHONE ENCOUNTER
Patient is being referred to a orthopedics  Please schedule accordingly      [address]   [phone]

## 2023-04-06 NOTE — PROGRESS NOTES
"Patient Name:  Geronimo Bingham  MRN:  [id number]    Assessment & Plan     Right fifth metacarpal base fracture and right wrist sprain after mechanical fall 3/10/2023 while visiting [place]  1  Removable wrist brace provided today to be worn at all times for the next 2 weeks, then wean as tolerated  2  Anti-inflammatories as needed  3  Follow-up in 2 weeks with primary care sports medicine  Chief Complaint     Right hand injury    History of the Present Illness     Geronimo Bingham is a 65 y o  right-hand-dominant female who reports to the office today for evaluation of her right hand  Patient sustained a mechanical fall with an outstretched hand on 3/10/2023 while visiting [place]  She initially noted significant pain along the ulnar aspect of the wrist and hand  She reported to urgent care in [place]  At that time x-rays were performed and she was told she had a contusion  He noted persistent pain and reported to her family physician yesterday  At that time repeat radiographs were ordered and performed revealing a possible fracture at the base of the fifth metacarpal   She was advised to follow-up with orthopedics  She still notes persistent pain along the ulnar aspect of the hand and wrist   Pain is worse with wrist range of motion and grasping objects  She notes weakness due to the pain  She denies any significant swelling or bruising  No numbness or tingling  She has been taking over-the-counter anti-inflammatories and Tylenol with improvement  She does note occasional radiation of the pain proximally towards the shoulder  Denies any neck pain  No fevers or chills  Physical Exam     /72   Ht 4' 11\" (1 499 m)   Wt 69 1 kg (152 lb 6 4 oz)   BMI 30 78 kg/m²     Right hand/wrist: No gross deformity  Skin intact  No erythema ecchymosis or swelling  There is tenderness at the base of the fifth metacarpal   No tenderness fourth metacarpal and carpus    No tenderness distal radius, distal " ulna, and scaphoid  Near full wrist range of motion with slight discomfort  Slightly limited composite fist formation with pain as well  No evidence of rotational or crossover deformity  Negative DRUJ shuck test   Sensation intact median ulnar and radial nerves  Full elbow range of motion without pain  Eyes: Anicteric sclerae  ENT: Trachea midline  Lungs: Normal respiratory effort  CV: Capillary refill is less than 2 seconds  Skin: Intact without erythema  Lymph: No palpable lymphadenopathy  Neuro: Sensation is grossly intact to light touch  Psych: Mood and affect are appropriate  Data Review     I have personally reviewed pertinent films in PACS, and my interpretation follows:    X-rays right hand and wrist 4/5/2023: Likely nondisplaced fracture at the base of the fifth metacarpal with signs of healing  No past medical history on file  No past surgical history on file  Allergies   Allergen Reactions   • Amoxicillin Rash       Current Outpatient Medications on File Prior to Visit   Medication Sig Dispense Refill   • ibuprofen (MOTRIN) 400 mg tablet Take by mouth every 6 (six) hours as needed for mild pain       No current facility-administered medications on file prior to visit         Social History     Tobacco Use   • Smoking status: Never   • Smokeless tobacco: Never   Vaping Use   • Vaping Use: Never used   Substance Use Topics   • Alcohol use: No   • Drug use: No       Family History   Problem Relation Age of Onset   • No Known Problems Mother    • Hyperlipidemia Father    • Hypertension Father    • Diabetes Father    • Heart attack Father    • Diabetes Sister    • Osteoporosis Sister    • No Known Problems Sister    • No Known Problems Sister    • No Known Problems Sister    • No Known Problems Sister    • No Known Problems Daughter    • No Known Problems Maternal Grandmother    • No Known Problems Maternal Grandfather    • No Known Problems Paternal Grandmother    • No Known Problems Paternal Grandfather    • No Known Problems Maternal Aunt    • No Known Problems Maternal Aunt    • No Known Problems Maternal Aunt    • No Known Problems Paternal Aunt    • No Known Problems Paternal Aunt    • No Known Problems Paternal Aunt    • No Known Problems Paternal Aunt    • No Known Problems Paternal Aunt        Review of Systems     As stated in the HPI  All other systems reviewed and are negative

## 2023-05-26 ENCOUNTER — TELEPHONE (OUTPATIENT)
Dept: GASTROENTEROLOGY | Facility: MEDICAL CENTER | Age: 63
End: 2023-05-26

## 2023-10-25 ENCOUNTER — TELEPHONE (OUTPATIENT)
Dept: INTERNAL MEDICINE CLINIC | Facility: CLINIC | Age: 63
End: 2023-10-25

## 2023-10-25 DIAGNOSIS — Z12.31 ENCOUNTER FOR SCREENING MAMMOGRAM FOR BREAST CANCER: Primary | ICD-10-CM

## 2023-10-25 NOTE — TELEPHONE ENCOUNTER
Patient calling this afternoon and stated," I need Dr. Daryl Caldwell to place a Referral in the system for my mammogram."

## 2023-11-01 ENCOUNTER — VBI (OUTPATIENT)
Dept: ADMINISTRATIVE | Facility: OTHER | Age: 63
End: 2023-11-01

## 2023-11-09 ENCOUNTER — HOSPITAL ENCOUNTER (OUTPATIENT)
Dept: MAMMOGRAPHY | Facility: MEDICAL CENTER | Age: 63
Discharge: HOME | End: 2023-11-09
Payer: COMMERCIAL

## 2023-11-09 VITALS — HEIGHT: 59 IN | WEIGHT: 151.9 LBS | BODY MASS INDEX: 30.62 KG/M2

## 2023-11-09 DIAGNOSIS — Z12.31 ENCOUNTER FOR SCREENING MAMMOGRAM FOR BREAST CANCER: ICD-10-CM

## 2023-11-09 PROCEDURE — 77067 SCR MAMMO BI INCL CAD: CPT

## 2023-11-09 PROCEDURE — 77063 BREAST TOMOSYNTHESIS BI: CPT

## 2023-11-20 ENCOUNTER — RA CDI HCC (OUTPATIENT)
Dept: OTHER | Facility: HOSPITAL | Age: 63
End: 2023-11-20

## 2023-12-01 DIAGNOSIS — E66.3 OVERWEIGHT (BMI 25.0-29.9): ICD-10-CM

## 2023-12-01 DIAGNOSIS — R73.03 PRE-DIABETES: Primary | ICD-10-CM

## 2023-12-02 ENCOUNTER — APPOINTMENT (OUTPATIENT)
Dept: LAB | Facility: MEDICAL CENTER | Age: 63
End: 2023-12-02
Payer: COMMERCIAL

## 2023-12-02 DIAGNOSIS — R73.03 PRE-DIABETES: ICD-10-CM

## 2023-12-02 DIAGNOSIS — E66.3 OVERWEIGHT (BMI 25.0-29.9): ICD-10-CM

## 2023-12-02 LAB
ALBUMIN SERPL BCP-MCNC: 4.2 G/DL (ref 3.5–5)
ALP SERPL-CCNC: 46 U/L (ref 34–104)
ALT SERPL W P-5'-P-CCNC: 20 U/L (ref 7–52)
ANION GAP SERPL CALCULATED.3IONS-SCNC: 5 MMOL/L
AST SERPL W P-5'-P-CCNC: 18 U/L (ref 13–39)
BASOPHILS # BLD AUTO: 0.04 THOUSANDS/ÂΜL (ref 0–0.1)
BASOPHILS NFR BLD AUTO: 1 % (ref 0–1)
BILIRUB SERPL-MCNC: 0.37 MG/DL (ref 0.2–1)
BUN SERPL-MCNC: 9 MG/DL (ref 5–25)
CALCIUM SERPL-MCNC: 9.1 MG/DL (ref 8.4–10.2)
CHLORIDE SERPL-SCNC: 107 MMOL/L (ref 96–108)
CHOLEST SERPL-MCNC: 226 MG/DL
CO2 SERPL-SCNC: 29 MMOL/L (ref 21–32)
CREAT SERPL-MCNC: 0.65 MG/DL (ref 0.6–1.3)
EOSINOPHIL # BLD AUTO: 0.11 THOUSAND/ÂΜL (ref 0–0.61)
EOSINOPHIL NFR BLD AUTO: 2 % (ref 0–6)
ERYTHROCYTE [DISTWIDTH] IN BLOOD BY AUTOMATED COUNT: 13.8 % (ref 11.6–15.1)
EST. AVERAGE GLUCOSE BLD GHB EST-MCNC: 134 MG/DL
GFR SERPL CREATININE-BSD FRML MDRD: 96 ML/MIN/1.73SQ M
GLUCOSE P FAST SERPL-MCNC: 84 MG/DL (ref 65–99)
HBA1C MFR BLD: 6.3 %
HCT VFR BLD AUTO: 40.2 % (ref 34.8–46.1)
HDLC SERPL-MCNC: 79 MG/DL
HGB BLD-MCNC: 12.4 G/DL (ref 11.5–15.4)
IMM GRANULOCYTES # BLD AUTO: 0.01 THOUSAND/UL (ref 0–0.2)
IMM GRANULOCYTES NFR BLD AUTO: 0 % (ref 0–2)
LDLC SERPL CALC-MCNC: 126 MG/DL (ref 0–100)
LYMPHOCYTES # BLD AUTO: 2.06 THOUSANDS/ÂΜL (ref 0.6–4.47)
LYMPHOCYTES NFR BLD AUTO: 43 % (ref 14–44)
MCH RBC QN AUTO: 26.7 PG (ref 26.8–34.3)
MCHC RBC AUTO-ENTMCNC: 30.8 G/DL (ref 31.4–37.4)
MCV RBC AUTO: 87 FL (ref 82–98)
MONOCYTES # BLD AUTO: 0.41 THOUSAND/ÂΜL (ref 0.17–1.22)
MONOCYTES NFR BLD AUTO: 9 % (ref 4–12)
NEUTROPHILS # BLD AUTO: 2.13 THOUSANDS/ÂΜL (ref 1.85–7.62)
NEUTS SEG NFR BLD AUTO: 45 % (ref 43–75)
NONHDLC SERPL-MCNC: 147 MG/DL
NRBC BLD AUTO-RTO: 0 /100 WBCS
PLATELET # BLD AUTO: 263 THOUSANDS/UL (ref 149–390)
PMV BLD AUTO: 9.7 FL (ref 8.9–12.7)
POTASSIUM SERPL-SCNC: 4.2 MMOL/L (ref 3.5–5.3)
PROT SERPL-MCNC: 7 G/DL (ref 6.4–8.4)
RBC # BLD AUTO: 4.64 MILLION/UL (ref 3.81–5.12)
SODIUM SERPL-SCNC: 141 MMOL/L (ref 135–147)
TRIGL SERPL-MCNC: 106 MG/DL
TSH SERPL DL<=0.05 MIU/L-ACNC: 2.35 UIU/ML (ref 0.45–4.5)
WBC # BLD AUTO: 4.76 THOUSAND/UL (ref 4.31–10.16)

## 2023-12-02 PROCEDURE — 80053 COMPREHEN METABOLIC PANEL: CPT

## 2023-12-02 PROCEDURE — 83036 HEMOGLOBIN GLYCOSYLATED A1C: CPT

## 2023-12-02 PROCEDURE — 84443 ASSAY THYROID STIM HORMONE: CPT

## 2023-12-02 PROCEDURE — 36415 COLL VENOUS BLD VENIPUNCTURE: CPT

## 2023-12-02 PROCEDURE — 80061 LIPID PANEL: CPT

## 2023-12-02 PROCEDURE — 85025 COMPLETE CBC W/AUTO DIFF WBC: CPT

## 2023-12-04 ENCOUNTER — VBI (OUTPATIENT)
Dept: ADMINISTRATIVE | Facility: OTHER | Age: 63
End: 2023-12-04

## 2023-12-18 ENCOUNTER — OFFICE VISIT (OUTPATIENT)
Dept: INTERNAL MEDICINE CLINIC | Facility: CLINIC | Age: 63
End: 2023-12-18
Payer: COMMERCIAL

## 2023-12-18 ENCOUNTER — TELEPHONE (OUTPATIENT)
Dept: INTERNAL MEDICINE CLINIC | Facility: CLINIC | Age: 63
End: 2023-12-18

## 2023-12-18 VITALS
HEIGHT: 59 IN | RESPIRATION RATE: 16 BRPM | OXYGEN SATURATION: 98 % | BODY MASS INDEX: 31.65 KG/M2 | WEIGHT: 157 LBS | HEART RATE: 108 BPM | TEMPERATURE: 95.2 F | DIASTOLIC BLOOD PRESSURE: 60 MMHG | SYSTOLIC BLOOD PRESSURE: 122 MMHG

## 2023-12-18 DIAGNOSIS — E66.3 OVERWEIGHT (BMI 25.0-29.9): ICD-10-CM

## 2023-12-18 DIAGNOSIS — R06.09 DOE (DYSPNEA ON EXERTION): ICD-10-CM

## 2023-12-18 DIAGNOSIS — Z12.11 SCREENING FOR COLORECTAL CANCER: ICD-10-CM

## 2023-12-18 DIAGNOSIS — S62.346A CLOSED NONDISPLACED FRACTURE OF BASE OF FIFTH METACARPAL BONE OF RIGHT HAND, INITIAL ENCOUNTER: ICD-10-CM

## 2023-12-18 DIAGNOSIS — Z12.4 CERVICAL CANCER SCREENING: ICD-10-CM

## 2023-12-18 DIAGNOSIS — Z00.00 ANNUAL PHYSICAL EXAM: Primary | ICD-10-CM

## 2023-12-18 DIAGNOSIS — R73.03 PREDIABETES: ICD-10-CM

## 2023-12-18 DIAGNOSIS — Z12.12 SCREENING FOR COLORECTAL CANCER: ICD-10-CM

## 2023-12-18 PROCEDURE — 99396 PREV VISIT EST AGE 40-64: CPT | Performed by: INTERNAL MEDICINE

## 2023-12-18 NOTE — TELEPHONE ENCOUNTER
Please advise thank you         Hi, my name is Bobby Gray. I have a appointment with Doctor Salinas just at 2:30 and and your date of birth December 15, 1960 and I check out Doctor Enedina. [employer] asked to print out some information about the cholesterol. I would like to know that and that paper I forget to . So I will appreciate if you mail that paper to me. It's a information about the how to take care of cholesterol and I forget to  those not in. I thought this is not my paper but it took my paper. So appreciate if you send me the information about the collateral. OK, take care. OK. Have a nice evening

## 2023-12-18 NOTE — ASSESSMENT & PLAN NOTE
Increase in A1c noted.  Counseled on need for healthy dietary and lifestyle choices.  Recheck labs in 6 months

## 2023-12-18 NOTE — PROGRESS NOTES
ADULT ANNUAL PHYSICAL  [facility] INTERNAL MEDICINE [place]    NAME: Bobby Gray  AGE: 63 y.o. SEX: female  : 1960     DATE: 2023     Assessment and Plan:     Here today for annual physical   Medical history of prediabetes, Chikungunya infection with resulting arthritis, hyperlipidemia    Received flu shot in October  She received first dose of Shingrix around October and will go to pharmacy and at least 2 months to complete her series    Due for cervical cancer screening    Due for recall colonoscopy    Problem List Items Addressed This Visit     Overweight (BMI 25.0-29.9)    Prediabetes     Increase in A1c noted.  Counseled on need for healthy dietary and lifestyle choices.  Recheck labs in 6 months         Relevant Orders    Hemoglobin A1C    Lipid Panel with Direct LDL reflex    Basic metabolic panel    Closed nondisplaced fracture of base of fifth metacarpal bone of right hand     Sustained nondisplaced fracture of the base of the fifth metacarpal back in 2023.  Tripped and fell on a sidewalk while in [place].  This appears to have healed well.  No surgical intervention was needed  -We will check DEXA scan         Relevant Orders    DXA bone density spine hip and pelvis    RUBIN (dyspnea on exertion)     Patient reports dyspnea on exertion in the mornings when she wakes up.  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  She will be fine the remainder of the day.  Has no issues exercising at the gym, using elliptical, goes up and down the stairs at school with no problem.  Denies any coughing, wheezing, chest pain.  No orthopnea, peripheral edema.  Weight has been stable.  Exam unremarkable today    Cause of symptoms unclear, symptoms do not appear to be consistently reducible with exertion.  Recommend monitoring for now        Other Visit Diagnoses     Annual physical exam    -  Primary    Screening for  colorectal cancer        Relevant Orders    Ambulatory referral to Gastroenterology    Cervical cancer screening        Relevant Orders    Ambulatory Referral to Gynecology          Immunizations and preventive care screenings were discussed with patient today. Appropriate education was printed on patient's after visit summary.    Counseling:  Alcohol/drug use: discussed moderation in alcohol intake, the recommendations for healthy alcohol use, and avoidance of illicit drug use.  Dental Health: discussed importance of regular tooth brushing, flossing, and dental visits.  Sexual health: discussed sexually transmitted diseases, partner selection, use of condoms, avoidance of unintended pregnancy, and contraceptive alternatives.  Exercise: the importance of regular exercise/physical activity was discussed. Recommend exercise 3-5 times per week for at least 30 minutes.          Return in about 1 year (around 12/18/2024).     Chief Complaint:     Chief Complaint   Patient presents with   • Annual Exam      History of Present Illness:     Adult Annual Physical   Patient here for a comprehensive physical exam. The patient reports     Diet and Physical Activity       Depression Screening  PHQ-2/9 Depression Screening            Review of Systems:     Review of Systems   Past Medical History:     No past medical history on file.   Past Surgical History:     No past surgical history on file.   Social History:     Social History     Socioeconomic History   • Marital status: /Civil Union     Spouse name: None   • Number of children: None   • Years of education: None   • Highest education level: None   Occupational History   • None   Tobacco Use   • Smoking status: Never   • Smokeless tobacco: Never   Vaping Use   • Vaping status: Never Used   Substance and Sexual Activity   • Alcohol use: No   • Drug use: No   • Sexual activity: Yes     Partners: Male   Other Topics Concern   • None   Social History Narrative   • None  "    Social Determinants of Health     Financial Resource Strain: Not on file   Food Insecurity: Not on file   Transportation Needs: Not on file   Physical Activity: Not on file   Stress: Not on file   Social Connections: Not on file   Intimate Partner Violence: Not on file   Housing Stability: Not on file      Family History:     Family History   Problem Relation Age of Onset   • No Known Problems Mother    • Hyperlipidemia Father    • Hypertension Father    • Diabetes Father    • Heart attack Father    • Diabetes Sister    • Osteoporosis Sister    • No Known Problems Sister    • No Known Problems Sister    • No Known Problems Sister    • No Known Problems Sister    • No Known Problems Daughter    • No Known Problems Maternal Grandmother    • No Known Problems Maternal Grandfather    • No Known Problems Paternal Grandmother    • No Known Problems Paternal Grandfather    • No Known Problems Maternal Aunt    • No Known Problems Maternal Aunt    • No Known Problems Maternal Aunt    • No Known Problems Paternal Aunt    • No Known Problems Paternal Aunt    • No Known Problems Paternal Aunt    • No Known Problems Paternal Aunt    • No Known Problems Paternal Aunt    • Cancer Cousin         cervical      Current Medications:     Current Outpatient Medications   Medication Sig Dispense Refill   • ibuprofen (MOTRIN) 400 mg tablet Take by mouth every 6 (six) hours as needed for mild pain (Patient not taking: Reported on 12/18/2023)       No current facility-administered medications for this visit.      Allergies:     Allergies   Allergen Reactions   • Amoxicillin Rash      Physical Exam:     /60 (BP Location: Left arm, Patient Position: Sitting, Cuff Size: Standard)   Pulse (!) 108   Temp (!) 95.2 °F (35.1 °C)   Resp 16   Ht 4' 11\" (1.499 m)   Wt 71.2 kg (157 lb)   SpO2 98%   BMI 31.71 kg/m²     Physical Exam  Constitutional:       Appearance: Normal appearance. She is not ill-appearing.   HENT:      Head: " Normocephalic and atraumatic.   Eyes:      General: No scleral icterus.        Right eye: No discharge.         Left eye: No discharge.   Cardiovascular:      Rate and Rhythm: Normal rate and regular rhythm.      Heart sounds: No murmur heard.     No friction rub.   Pulmonary:      Effort: Pulmonary effort is normal.      Breath sounds: Normal breath sounds. No wheezing or rales.   Abdominal:      General: Abdomen is flat. There is no distension.      Palpations: Abdomen is soft.      Tenderness: There is no abdominal tenderness.   Musculoskeletal:         General: No swelling, tenderness or deformity.   Skin:     General: Skin is warm and dry.      Findings: No erythema.   Neurological:      Mental Status: She is alert and oriented to person, place, and time. Mental status is at baseline.      Motor: No weakness.   Psychiatric:         Mood and Affect: Mood normal.         Behavior: Behavior normal.          Garrett Rodriguez,   [facility] INTERNAL MEDICINE [facility]

## 2023-12-18 NOTE — ASSESSMENT & PLAN NOTE
Patient reports dyspnea on exertion in the mornings when she wakes up.  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  She will be fine the remainder of the day.  Has no issues exercising at the gym, using elliptical, goes up and down the stairs at school with no problem.  Denies any coughing, wheezing, chest pain.  No orthopnea, peripheral edema.  Weight has been stable.  Exam unremarkable today    Cause of symptoms unclear, symptoms do not appear to be consistently reducible with exertion.  Recommend monitoring for now

## 2023-12-18 NOTE — ASSESSMENT & PLAN NOTE
Sustained nondisplaced fracture of the base of the fifth metacarpal back in April 2023.  Tripped and fell on a sidewalk while in [place].  This appears to have healed well.  No surgical intervention was needed  -We will check DEXA scan

## 2023-12-26 ENCOUNTER — HOSPITAL ENCOUNTER (OUTPATIENT)
Dept: BONE DENSITY | Facility: MEDICAL CENTER | Age: 63
Discharge: HOME | End: 2023-12-26
Payer: COMMERCIAL

## 2023-12-26 DIAGNOSIS — S62.346A CLOSED NONDISPLACED FRACTURE OF BASE OF FIFTH METACARPAL BONE OF RIGHT HAND, INITIAL ENCOUNTER: ICD-10-CM

## 2023-12-26 PROCEDURE — 77080 DXA BONE DENSITY AXIAL: CPT

## 2024-01-11 ENCOUNTER — PREP FOR PROCEDURE (OUTPATIENT)
Age: 64
End: 2024-01-11

## 2024-01-11 ENCOUNTER — TELEPHONE (OUTPATIENT)
Age: 64
End: 2024-01-11

## 2024-01-11 ENCOUNTER — TELEPHONE (OUTPATIENT)
Dept: INTERNAL MEDICINE CLINIC | Facility: CLINIC | Age: 64
End: 2024-01-11

## 2024-01-11 DIAGNOSIS — Z12.11 SCREENING FOR COLON CANCER: Primary | ICD-10-CM

## 2024-01-11 NOTE — TELEPHONE ENCOUNTER
Scheduled date of colonoscopy (as of today): 2/22/24  Physician performing colonoscopy:   Location of colonoscopy: [facility]  Bowel prep reviewed with patient: Miralax Dulcolax prep instructions reviewed and sent via [employer]   Instructions reviewed with patient by: md  Clearances:   ASC assessment completed

## 2024-01-11 NOTE — TELEPHONE ENCOUNTER
----- Message from Garrett Rodriguez DO sent at 1/11/2024  8:25 AM EST -----  Bone density scan shows osteopenia (slight decrease in bone density), but not osteoporosis, which is good. Recommend adequate calcium (at least 1200 mg daily) and vitamin D (at least 1000 IU daily) intake and regular weightbearing exercise to promote bone health     We can repeat the DEXA scan in 2 years for ongoing monitoring

## 2024-01-15 ENCOUNTER — TELEPHONE (OUTPATIENT)
Age: 64
End: 2024-01-15

## 2024-01-17 NOTE — PROGRESS NOTES
[facility] [address], [place]    ASSESSMENT/PLAN: Bobby Gray is a 63 y.o.  who presents for annual gynecologic exam.    Encounter for routine gynecologic examination  - Routine well woman exam completed today.  - Cervical Cancer Screening: Current ASCCP Guidelines reviewed. Last Pap: Not on file 2018. Next Pap Due: today  - HPV Vaccination status: Not immunized  - STI screening offered including HIV: not indicated based on hx or requested at time of visit  - Breast Cancer Screening: Last Mammogram 2023, script given  - Colorectal cancer screening. Has an appointment 3/2024  - The following were reviewed in today's visit: breast self exam, mammography screening ordered, adequate intake of calcium and vitamin D, exercise, and age related changes  - RTO 1 yr    Additional problems addressed at this visit:  1. Encounter for annual routine gynecological examination  -     Liquid-based pap, screening  -     Mammo screening bilateral w 3d & cad; Future; Expected date: 2024    2. Cervical cancer screening  -     Ambulatory Referral to Gynecology  -     Liquid-based pap, screening    3. Encounter for screening mammogram for breast cancer  -     Mammo screening bilateral w 3d & cad; Future; Expected date: 2024          CC:  Annual Gynecologic Examination    HPI: Bobby Gray is a 63 y.o.  who presents for annual gynecologic examination.  Bobby presents today for gyn exam. No vaginal bleeding since onset of menopause.  last pap smear- normal, Hx of abnormal pap smear no. Sexually active- no, with partner for 34 yrs. Does not desire STI testing.  2023 mammogram- normal, and has appointment 3/29/24 colonoscopy. Reports 7-8 hrs of sleep daily, 1-2 servings of calcium rich foods daily. Exercises: 2-3 days per week. 1-2 servings of caffeine daily. Does not perform SBE. Safe at home- yes. Wears seatbelt - yes Concerns: none.        The  "following portions of the patient's history were reviewed and updated as appropriate: allergies, current medications, past family history, past medical history, obstetric history, gynecologic history, past social history, past surgical history and problem list.    Review of Systems   Constitutional:  Negative for chills, fatigue and fever.   Respiratory:  Negative for cough and shortness of breath.    Cardiovascular:  Negative for chest pain, palpitations and leg swelling.   Gastrointestinal:  Negative for constipation and diarrhea.   Genitourinary:  Negative for difficulty urinating, dysuria, frequency, pelvic pain, urgency, vaginal bleeding, vaginal discharge and vaginal pain.   Neurological:  Negative for light-headedness and headaches.   Psychiatric/Behavioral:  The patient is not nervous/anxious.          Objective:  /66   Ht 5' 1\" (1.549 m)   Wt 72.8 kg (160 lb 9.6 oz)   BMI 30.35 kg/m²    Physical Exam  Vitals reviewed.   Constitutional:       Appearance: Normal appearance.   HENT:      Head: Normocephalic.   Neck:      Thyroid: No thyroid mass or thyroid tenderness.   Cardiovascular:      Rate and Rhythm: Normal rate and regular rhythm.      Heart sounds: Normal heart sounds.   Pulmonary:      Effort: Pulmonary effort is normal.      Breath sounds: Normal breath sounds.   Chest:   Breasts:     Right: No mass, nipple discharge, skin change or tenderness.      Left: No mass, nipple discharge, skin change or tenderness.   Abdominal:      General: There is no distension.      Palpations: There is no mass.      Tenderness: There is no abdominal tenderness. There is no guarding.   Genitourinary:     General: Normal vulva.      Exam position: Lithotomy position.      Labia:         Right: No tenderness or lesion.         Left: No tenderness or lesion.       Vagina: No vaginal discharge, tenderness, bleeding or lesions.      Cervix: No discharge, lesion, erythema or cervical bleeding.      Uterus: Normal. " Not enlarged and not tender.       Adnexa:         Right: No mass, tenderness or fullness.          Left: No mass, tenderness or fullness.        Comments: Mild atrophy noted.   Musculoskeletal:      Cervical back: Normal range of motion.   Lymphadenopathy:      Upper Body:      Right upper body: No axillary adenopathy.      Left upper body: No axillary adenopathy.   Skin:     General: Skin is warm and dry.   Neurological:      Mental Status: She is alert.   Psychiatric:         Mood and Affect: Mood normal.         Behavior: Behavior normal.         Judgment: Judgment normal.             Luz Friedman CNM  [facility]  01/18/24 8:36 AM

## 2024-01-18 ENCOUNTER — OFFICE VISIT (OUTPATIENT)
Dept: OBGYN CLINIC | Facility: MEDICAL CENTER | Age: 64
End: 2024-01-18
Payer: COMMERCIAL

## 2024-01-18 VITALS
HEIGHT: 61 IN | DIASTOLIC BLOOD PRESSURE: 66 MMHG | WEIGHT: 160.6 LBS | BODY MASS INDEX: 30.32 KG/M2 | SYSTOLIC BLOOD PRESSURE: 110 MMHG

## 2024-01-18 DIAGNOSIS — Z12.31 ENCOUNTER FOR SCREENING MAMMOGRAM FOR BREAST CANCER: ICD-10-CM

## 2024-01-18 DIAGNOSIS — Z12.4 CERVICAL CANCER SCREENING: ICD-10-CM

## 2024-01-18 DIAGNOSIS — Z01.419 ENCOUNTER FOR ANNUAL ROUTINE GYNECOLOGICAL EXAMINATION: Primary | ICD-10-CM

## 2024-01-18 PROCEDURE — G0145 SCR C/V CYTO,THINLAYER,RESCR: HCPCS | Performed by: ADVANCED PRACTICE MIDWIFE

## 2024-01-18 PROCEDURE — S0610 ANNUAL GYNECOLOGICAL EXAMINA: HCPCS | Performed by: ADVANCED PRACTICE MIDWIFE

## 2024-01-18 PROCEDURE — G0476 HPV COMBO ASSAY CA SCREEN: HCPCS | Performed by: ADVANCED PRACTICE MIDWIFE

## 2024-01-19 LAB
HPV HR 12 DNA CVX QL NAA+PROBE: NEGATIVE
HPV16 DNA CVX QL NAA+PROBE: NEGATIVE
HPV18 DNA CVX QL NAA+PROBE: NEGATIVE

## 2024-01-26 LAB
LAB AP GYN PRIMARY INTERPRETATION: NORMAL
Lab: NORMAL

## 2024-01-29 ENCOUNTER — TELEPHONE (OUTPATIENT)
Dept: OBGYN CLINIC | Facility: CLINIC | Age: 64
End: 2024-01-29

## 2024-01-29 NOTE — TELEPHONE ENCOUNTER
----- Message from Luz Friedman CNM sent at 1/29/2024  7:15 AM EST -----  Please inform Bobby of normal pap smear and HPV screening.

## 2024-01-29 NOTE — TELEPHONE ENCOUNTER
Per patient communication consent, left detailed message normal pap/hpv results. To call office with any questions or concerns.

## 2024-03-15 ENCOUNTER — ANESTHESIA EVENT (OUTPATIENT)
Dept: ANESTHESIOLOGY | Facility: HOSPITAL | Age: 64
End: 2024-03-15

## 2024-03-15 ENCOUNTER — ANESTHESIA (OUTPATIENT)
Dept: ANESTHESIOLOGY | Facility: HOSPITAL | Age: 64
End: 2024-03-15

## 2024-03-28 ENCOUNTER — ANESTHESIA EVENT (OUTPATIENT)
Dept: ANESTHESIOLOGY | Facility: HOSPITAL | Age: 64
End: 2024-03-28

## 2024-03-28 ENCOUNTER — ANESTHESIA (OUTPATIENT)
Dept: ANESTHESIOLOGY | Facility: HOSPITAL | Age: 64
End: 2024-03-28

## 2024-03-28 RX ORDER — ONDANSETRON 2 MG/ML
4 INJECTION INTRAMUSCULAR; INTRAVENOUS ONCE AS NEEDED
Status: CANCELLED | OUTPATIENT
Start: 2024-03-28

## 2024-03-28 RX ORDER — SODIUM CHLORIDE 9 MG/ML
125 INJECTION, SOLUTION INTRAVENOUS CONTINUOUS
Status: CANCELLED | OUTPATIENT
Start: 2024-03-28

## 2024-03-28 NOTE — ANESTHESIA PREPROCEDURE EVALUATION
Procedure:  PRE-OP ONLY    Relevant Problems   ANESTHESIA (within normal limits)      CARDIO   (+) RUBIN (dyspnea on exertion)   (+) Other hyperlipidemia      ENDO (within normal limits)      GI/HEPATIC (within normal limits)      /RENAL (within normal limits)      GYN (within normal limits)      HEMATOLOGY (within normal limits)      MUSCULOSKELETAL   (+) Primary osteoarthritis of right knee      NEURO/PSYCH (within normal limits)      PULMONARY   (+) RUBIN (dyspnea on exertion)             Anesthesia Plan  ASA Score- 2     Anesthesia Type- IV sedation with anesthesia with ASA Monitors.         Additional Monitors:     Airway Plan:            Plan Factors-Exercise tolerance (METS): >4 METS.    Chart reviewed.    Patient summary reviewed.    Patient is not a current smoker.              Induction- intravenous.    Postoperative Plan-     Informed Consent- Anesthetic plan and risks discussed with patient.

## 2024-03-29 ENCOUNTER — ANESTHESIA (OUTPATIENT)
Dept: GASTROENTEROLOGY | Facility: MEDICAL CENTER | Age: 64
End: 2024-03-29

## 2024-03-29 ENCOUNTER — ANESTHESIA EVENT (OUTPATIENT)
Dept: GASTROENTEROLOGY | Facility: MEDICAL CENTER | Age: 64
End: 2024-03-29

## 2024-03-29 ENCOUNTER — HOSPITAL ENCOUNTER (OUTPATIENT)
Dept: GASTROENTEROLOGY | Facility: MEDICAL CENTER | Age: 64
Setting detail: OUTPATIENT SURGERY
End: 2024-03-29
Payer: COMMERCIAL

## 2024-03-29 VITALS
RESPIRATION RATE: 18 BRPM | WEIGHT: 160 LBS | BODY MASS INDEX: 30.21 KG/M2 | HEART RATE: 59 BPM | SYSTOLIC BLOOD PRESSURE: 106 MMHG | DIASTOLIC BLOOD PRESSURE: 56 MMHG | OXYGEN SATURATION: 100 % | HEIGHT: 61 IN | TEMPERATURE: 96.1 F

## 2024-03-29 DIAGNOSIS — Z12.11 SCREENING FOR COLON CANCER: ICD-10-CM

## 2024-03-29 PROCEDURE — 88305 TISSUE EXAM BY PATHOLOGIST: CPT | Performed by: PATHOLOGY

## 2024-03-29 RX ORDER — ONDANSETRON 2 MG/ML
4 INJECTION INTRAMUSCULAR; INTRAVENOUS ONCE AS NEEDED
Status: SHIPPED | OUTPATIENT
Start: 2024-03-29

## 2024-03-29 RX ORDER — SODIUM CHLORIDE 9 MG/ML
125 INJECTION, SOLUTION INTRAVENOUS CONTINUOUS
Status: SHIPPED | OUTPATIENT
Start: 2024-03-29

## 2024-03-29 RX ORDER — PROPOFOL 10 MG/ML
INJECTION, EMULSION INTRAVENOUS AS NEEDED
Status: DISCONTINUED | OUTPATIENT
Start: 2024-03-29 | End: 2024-03-29

## 2024-03-29 RX ORDER — LIDOCAINE HYDROCHLORIDE 20 MG/ML
INJECTION, SOLUTION EPIDURAL; INFILTRATION; INTRACAUDAL; PERINEURAL AS NEEDED
Status: DISCONTINUED | OUTPATIENT
Start: 2024-03-29 | End: 2024-03-29

## 2024-03-29 RX ADMIN — SODIUM CHLORIDE 125 ML/HR: 0.9 INJECTION, SOLUTION INTRAVENOUS at 07:18

## 2024-03-29 RX ADMIN — PROPOFOL 50 MG: 10 INJECTION, EMULSION INTRAVENOUS at 07:41

## 2024-03-29 RX ADMIN — PROPOFOL 100 MG: 10 INJECTION, EMULSION INTRAVENOUS at 07:33

## 2024-03-29 RX ADMIN — Medication 40 MG: at 07:44

## 2024-03-29 RX ADMIN — LIDOCAINE HYDROCHLORIDE 80 MG: 20 INJECTION, SOLUTION EPIDURAL; INFILTRATION; INTRACAUDAL at 07:33

## 2024-03-29 NOTE — ANESTHESIA PREPROCEDURE EVALUATION
Procedure:  COLONOSCOPY    Relevant Problems   No relevant active problems        Physical Exam    Airway    Mallampati score: III  TM Distance: >3 FB  Neck ROM: full     Dental   No notable dental hx     Cardiovascular  Rhythm: regular, Rate: normal, Cardiovascular exam normal    Pulmonary  Pulmonary exam normal Breath sounds clear to auscultation    Other Findings  post-pubertal.      Anesthesia Plan  ASA Score- 2     Anesthesia Type- general with ASA Monitors.         Additional Monitors:     Airway Plan:            Plan Factors-Exercise tolerance (METS): >4 METS.    Chart reviewed.   Existing labs reviewed. Patient summary reviewed.    Patient is not a current smoker.              Induction- intravenous.    Postoperative Plan-     Informed Consent- Anesthetic plan and risks discussed with patient.

## 2024-03-29 NOTE — ANESTHESIA PREPROCEDURE EVALUATION
Procedure:  COLONOSCOPY    Relevant Problems   CARDIO   (+) RUBIN (dyspnea on exertion)   (+) Other hyperlipidemia      MUSCULOSKELETAL   (+) Primary osteoarthritis of right knee      PULMONARY   (+) RUBIN (dyspnea on exertion)        Physical Exam    Airway    Mallampati score: III  TM Distance: >3 FB       Dental   No notable dental hx     Cardiovascular  Rhythm: regular, Rate: normal, Cardiovascular exam normal    Pulmonary  Pulmonary exam normal Breath sounds clear to auscultation    Other Findings  post-pubertal.      Anesthesia Plan  ASA Score- 2     Anesthesia Type- IV sedation with anesthesia with ASA Monitors.         Additional Monitors:     Airway Plan:            Plan Factors-Exercise tolerance (METS): >4 METS.    Chart reviewed.    Patient summary reviewed.    Patient is not a current smoker.              Induction- intravenous.    Postoperative Plan-     Informed Consent- Anesthetic plan and risks discussed with patient.

## 2024-03-29 NOTE — H&P
"History and Physical -  [facility]  Bobby Gray 63 y.o. female MRN: [id number]                  HPI: Bobby Gray is a 63 y.o. year old female who presents for colonoscopy for colon cancer screening.      REVIEW OF SYSTEMS: Per the HPI, and otherwise unremarkable.    Historical Information   History reviewed. No pertinent past medical history.  Past Surgical History:   Procedure Laterality Date     SECTION       Social History   Social History     Substance and Sexual Activity   Alcohol Use No     Social History     Substance and Sexual Activity   Drug Use No     Social History     Tobacco Use   Smoking Status Never   Smokeless Tobacco Never     Family History   Problem Relation Age of Onset    No Known Problems Mother     Hyperlipidemia Father     Hypertension Father     Diabetes Father     Heart attack Father     Diabetes Sister     Osteoporosis Sister     No Known Problems Sister     No Known Problems Sister     No Known Problems Sister     No Known Problems Sister     Heart disease Brother     No Known Problems Daughter     No Known Problems Maternal Grandmother     No Known Problems Maternal Grandfather     No Known Problems Paternal Grandmother     No Known Problems Paternal Grandfather     No Known Problems Maternal Aunt     No Known Problems Maternal Aunt     No Known Problems Maternal Aunt     No Known Problems Paternal Aunt     No Known Problems Paternal Aunt     No Known Problems Paternal Aunt     No Known Problems Paternal Aunt     No Known Problems Paternal Aunt     Cancer Cousin         cervical       Meds/Allergies     (Not in a hospital admission)      Allergies   Allergen Reactions    Amoxicillin Rash       Objective     Blood pressure 120/59, pulse 63, temperature (!) 96.1 °F (35.6 °C), temperature source Temporal, resp. rate 16, height 5' 1\" (1.549 m), weight 72.6 kg (160 lb), SpO2 98%.      PHYSICAL EXAM    Gen: NAD  CV: RRR  CHEST: Clear  ABD: soft, NT/ND  EXT: " no edema      ASSESSMENT/PLAN:  This is a 63 y.o. year old female here for colonoscopy for colon cancer screening, and she is stable and optimized for her procedure.The patient is stable and optimized for the procedure, we reviewed risk and benefits. Risk include but not limited to infection, bleeding, perforation and missing a lesion.

## 2024-03-29 NOTE — ANESTHESIA POSTPROCEDURE EVALUATION
"Post-Op Assessment Note    CV Status:  Stable    Pain management: adequate       Mental Status:  Alert and awake   Hydration Status:  Euvolemic   PONV Controlled:  Controlled   Airway Patency:  Patent     Post Op Vitals Reviewed: Yes    No anethesia notable event occurred.    Staff: Anesthesiologist               BP      Temp      Pulse     Resp      SpO2      /56   Pulse 59   Temp (!) 96.1 °F (35.6 °C) (Temporal)   Resp 18   Ht 5' 1\" (1.549 m)   Wt 72.6 kg (160 lb)   SpO2 100%   BMI 30.23 kg/m²     "

## 2024-09-16 ENCOUNTER — TELEPHONE (OUTPATIENT)
Age: 64
End: 2024-09-16

## 2024-09-16 NOTE — TELEPHONE ENCOUNTER
Patient called requesting Tdap, RSV and Covid vaccine(s) for the following reason(s): Preventative and expecting grandchild soon.     No order in system for Tdap, covid & RSV. Please advise and/or schedule accordingly.     LOV:  12/18/23    [phone]    (Pt scheduled for 9/24/24 flu shot)

## 2024-09-18 ENCOUNTER — IMMUNIZATIONS (OUTPATIENT)
Dept: INTERNAL MEDICINE CLINIC | Facility: CLINIC | Age: 64
End: 2024-09-18
Payer: COMMERCIAL

## 2024-09-18 DIAGNOSIS — Z23 NEED FOR INFLUENZA VACCINATION: Primary | ICD-10-CM

## 2024-09-18 PROCEDURE — 90471 IMMUNIZATION ADMIN: CPT

## 2024-09-18 PROCEDURE — 90673 RIV3 VACCINE NO PRESERV IM: CPT

## 2025-07-08 NOTE — PROGRESS NOTES
Name: Bobby Gray      : 1960      MRN: [id number]  Encounter Provider: BHARGAV Jo  Encounter Date: 2025   [facility]  :  Assessment & Plan  Acute right ankle pain  Assessment:    Bobby Gray is a 64 y.o. female who complains pain to the right ankle that began about 3 days ago, although she thinks she might have irritated her ankle about a week prior when [place].  Denies any trauma or injury.  Describes pain as intermittent and sharp, states it is worse with climbing stairs and other activity, and improves with rest.      On exam she appears well, vital signs are normal.  No obvious deformity or discoloration to the right ankle.  No swelling noted to the area.  No open wounds or skin changes.  No tenderness upon palpation.  No warmth or fluctuance.  Full active range of motion, although patient does report minor pain with dorsiflexion.  Strength normal.  Intact sensation.  Dorsalis pedis and posterior tibial pulses palpable.  Slight antalgic gait noted with patient favoring left side.  Negative Haque test.  Negative Tinel's sign.    Ankle pain without trauma, likely due to sprain.  Low suspicion for fracture or other abnormality.      Plan:    Activity Modification: Advise rest and avoidance of aggravating activities and wear supportive footwear as needed.  Pain Control: Recommend NSAIDs (e.g., ibuprofen or naproxen) if no contraindications; option for topical NSAID, which patient declined.  States she has some Voltaren gel at home already and will use if needed.  Ice & Elevation: Ice 15-20 minutes 3-4 times/day and elevate ankle as able.  Support: Use of ankle brace or compression sleeve for additional support.  Stretching: Begin gentle range of motion and stretching exercises as tolerated.  Follow-Up: Reassess in 1-2 weeks or sooner if pain worsens, swelling increases, or weight-bearing becomes  "impossible.  Referral: Consider physical therapy referral if not improving with conservative measures.          Encounter for screening mammogram for breast cancer  Overdue for mammogram screening; last one in 2023.  Patient voices willingness to schedule mammogram.  Orders provided.  Orders:  •  Mammo screening bilateral w 3d and cad; Future    Primary osteoarthritis of right knee  Reports mild pain in her right knee.  No abnormalities noted on exam.  Discussed her diagnosis of primary osteoarthritis of the right knee and possible treatment options.  Patient agreeable to follow-up at next visit.            History of Present Illness {?Quick Links Encounters * My Last Note * Last Note in Specialty * Snapshot * Since Last Visit * History :76428}  Patient presents with:  Ankle Pain: Rt ankle - pain, limping  did not fall. last two days having pain   Shooting pain            Review of Systems   Musculoskeletal:  Positive for arthralgias and gait problem (pain).   All other systems reviewed and are negative.      Objective {?Quick Links Trend Vitals * Enter New Vitals * Results Review * Timeline (Adult) * Labs * Imaging * Cardiology * Procedures * Lung Cancer Screening * Surgical eConsent :[id number]}  /70 (BP Location: Left arm, Patient Position: Sitting, Cuff Size: Standard)   Pulse 74   Temp (!) 97.4 °F (36.3 °C) (Temporal)   Resp 18   Ht 5' 1\" (1.549 m)   Wt 72.1 kg (159 lb)   SpO2 98%   BMI 30.04 kg/m²      Physical Exam  Vitals and nursing note reviewed.     Cardiovascular:      Rate and Rhythm: Normal rate and regular rhythm.      Heart sounds: Normal heart sounds. No murmur heard.  Pulmonary:      Effort: Pulmonary effort is normal.      Breath sounds: Normal breath sounds. No wheezing, rhonchi or rales.     Musculoskeletal:      Right ankle: No swelling, deformity, ecchymosis or lacerations. No tenderness. Normal range of motion. Anterior drawer test negative. Normal pulse.      Right Achilles Tendon: " Normal. Haque's test negative.

## 2025-07-09 ENCOUNTER — OFFICE VISIT (OUTPATIENT)
Dept: INTERNAL MEDICINE CLINIC | Facility: CLINIC | Age: 65
End: 2025-07-09
Payer: COMMERCIAL

## 2025-07-09 VITALS
RESPIRATION RATE: 18 BRPM | BODY MASS INDEX: 30.02 KG/M2 | HEART RATE: 74 BPM | WEIGHT: 159 LBS | TEMPERATURE: 97.4 F | HEIGHT: 61 IN | DIASTOLIC BLOOD PRESSURE: 70 MMHG | SYSTOLIC BLOOD PRESSURE: 114 MMHG | OXYGEN SATURATION: 98 %

## 2025-07-09 DIAGNOSIS — M25.571 ACUTE RIGHT ANKLE PAIN: Primary | ICD-10-CM

## 2025-07-09 DIAGNOSIS — Z12.31 ENCOUNTER FOR SCREENING MAMMOGRAM FOR BREAST CANCER: ICD-10-CM

## 2025-07-09 DIAGNOSIS — M17.11 PRIMARY OSTEOARTHRITIS OF RIGHT KNEE: ICD-10-CM

## 2025-07-09 PROCEDURE — 99214 OFFICE O/P EST MOD 30 MIN: CPT

## 2025-07-09 NOTE — ASSESSMENT & PLAN NOTE
Reports mild pain in her right knee.  No abnormalities noted on exam.  Discussed her diagnosis of primary osteoarthritis of the right knee and possible treatment options.  Patient agreeable to follow-up at next visit.